# Patient Record
Sex: MALE | Race: WHITE | Employment: OTHER | URBAN - METROPOLITAN AREA
[De-identification: names, ages, dates, MRNs, and addresses within clinical notes are randomized per-mention and may not be internally consistent; named-entity substitution may affect disease eponyms.]

---

## 2018-09-06 PROBLEM — K56.609 SBO (SMALL BOWEL OBSTRUCTION) (HCC): Status: ACTIVE | Noted: 2018-09-06

## 2018-09-06 PROBLEM — E03.9 ACQUIRED HYPOTHYROIDISM: Status: ACTIVE | Noted: 2018-09-06

## 2018-09-06 PROBLEM — I10 ESSENTIAL HYPERTENSION: Status: ACTIVE | Noted: 2018-09-06

## 2018-09-24 ENCOUNTER — APPOINTMENT (OUTPATIENT)
Dept: GENERAL RADIOLOGY | Age: 83
End: 2018-09-24
Payer: COMMERCIAL

## 2018-09-24 ENCOUNTER — HOSPITAL ENCOUNTER (EMERGENCY)
Age: 83
Discharge: HOME | End: 2018-09-24
Attending: EMERGENCY MEDICINE
Payer: COMMERCIAL

## 2018-09-24 VITALS
SYSTOLIC BLOOD PRESSURE: 119 MMHG | HEART RATE: 60 BPM | TEMPERATURE: 97.7 F | OXYGEN SATURATION: 98 % | RESPIRATION RATE: 18 BRPM | DIASTOLIC BLOOD PRESSURE: 74 MMHG

## 2018-09-24 DIAGNOSIS — R07.89 ATYPICAL CHEST PAIN: Primary | ICD-10-CM

## 2018-09-24 DIAGNOSIS — J18.9 PNEUMONIA DUE TO ORGANISM: ICD-10-CM

## 2018-09-24 LAB
ALBUMIN SERPL-MCNC: 3.1 G/DL (ref 3.4–5)
ALP BLD-CCNC: 94 U/L (ref 40–129)
ALT SERPL-CCNC: 16 U/L (ref 10–40)
ANION GAP SERPL CALCULATED.3IONS-SCNC: 10 MMOL/L (ref 3–16)
AST SERPL-CCNC: 29 U/L (ref 15–37)
BASOPHILS ABSOLUTE: 0.1 K/UL (ref 0–0.2)
BASOPHILS RELATIVE PERCENT: 1.1 %
BILIRUB SERPL-MCNC: 0.7 MG/DL (ref 0–1)
BILIRUBIN DIRECT: <0.2 MG/DL (ref 0–0.3)
BILIRUBIN URINE: NEGATIVE
BILIRUBIN, INDIRECT: ABNORMAL MG/DL (ref 0–1)
BLOOD, URINE: NEGATIVE
BUN BLDV-MCNC: 15 MG/DL (ref 7–20)
CALCIUM SERPL-MCNC: 8.8 MG/DL (ref 8.3–10.6)
CHLORIDE BLD-SCNC: 100 MMOL/L (ref 99–110)
CLARITY: CLEAR
CO2: 28 MMOL/L (ref 21–32)
COLOR: YELLOW
CREAT SERPL-MCNC: 0.8 MG/DL (ref 0.8–1.3)
EOSINOPHILS ABSOLUTE: 0.1 K/UL (ref 0–0.6)
EOSINOPHILS RELATIVE PERCENT: 1.4 %
EPITHELIAL CELLS, UA: 1 /HPF (ref 0–5)
GFR AFRICAN AMERICAN: >60
GFR NON-AFRICAN AMERICAN: >60
GLUCOSE BLD-MCNC: 91 MG/DL (ref 70–99)
GLUCOSE URINE: NEGATIVE MG/DL
HCT VFR BLD CALC: 35.9 % (ref 40.5–52.5)
HEMOGLOBIN: 12.2 G/DL (ref 13.5–17.5)
HYALINE CASTS: 2 /LPF (ref 0–8)
KETONES, URINE: ABNORMAL MG/DL
LEUKOCYTE ESTERASE, URINE: ABNORMAL
LYMPHOCYTES ABSOLUTE: 1.4 K/UL (ref 1–5.1)
LYMPHOCYTES RELATIVE PERCENT: 19.9 %
MCH RBC QN AUTO: 33.6 PG (ref 26–34)
MCHC RBC AUTO-ENTMCNC: 34 G/DL (ref 31–36)
MCV RBC AUTO: 98.7 FL (ref 80–100)
MICROSCOPIC EXAMINATION: YES
MONOCYTES ABSOLUTE: 0.6 K/UL (ref 0–1.3)
MONOCYTES RELATIVE PERCENT: 8.9 %
NEUTROPHILS ABSOLUTE: 4.7 K/UL (ref 1.7–7.7)
NEUTROPHILS RELATIVE PERCENT: 68.7 %
NITRITE, URINE: NEGATIVE
PDW BLD-RTO: 13.6 % (ref 12.4–15.4)
PH UA: 7
PLATELET # BLD: 296 K/UL (ref 135–450)
PMV BLD AUTO: 7.9 FL (ref 5–10.5)
POTASSIUM SERPL-SCNC: 5.1 MMOL/L (ref 3.5–5.1)
PRO-BNP: 1305 PG/ML (ref 0–449)
PROTEIN UA: NEGATIVE MG/DL
RBC # BLD: 3.64 M/UL (ref 4.2–5.9)
RBC UA: 2 /HPF (ref 0–4)
SODIUM BLD-SCNC: 138 MMOL/L (ref 136–145)
SPECIFIC GRAVITY UA: 1.02
TOTAL PROTEIN: 6.9 G/DL (ref 6.4–8.2)
TROPONIN: 0.03 NG/ML
URINE REFLEX TO CULTURE: YES
URINE TYPE: ABNORMAL
UROBILINOGEN, URINE: 1 E.U./DL
WBC # BLD: 6.9 K/UL (ref 4–11)
WBC UA: 7 /HPF (ref 0–5)

## 2018-09-24 PROCEDURE — 83880 ASSAY OF NATRIURETIC PEPTIDE: CPT

## 2018-09-24 PROCEDURE — 99285 EMERGENCY DEPT VISIT HI MDM: CPT

## 2018-09-24 PROCEDURE — 36415 COLL VENOUS BLD VENIPUNCTURE: CPT

## 2018-09-24 PROCEDURE — 87086 URINE CULTURE/COLONY COUNT: CPT

## 2018-09-24 PROCEDURE — 80048 BASIC METABOLIC PNL TOTAL CA: CPT

## 2018-09-24 PROCEDURE — 71045 X-RAY EXAM CHEST 1 VIEW: CPT

## 2018-09-24 PROCEDURE — 87077 CULTURE AEROBIC IDENTIFY: CPT

## 2018-09-24 PROCEDURE — 87186 SC STD MICRODIL/AGAR DIL: CPT

## 2018-09-24 PROCEDURE — 93005 ELECTROCARDIOGRAM TRACING: CPT | Performed by: ANESTHESIOLOGY

## 2018-09-24 PROCEDURE — 80076 HEPATIC FUNCTION PANEL: CPT

## 2018-09-24 PROCEDURE — 84484 ASSAY OF TROPONIN QUANT: CPT

## 2018-09-24 PROCEDURE — 85025 COMPLETE CBC W/AUTO DIFF WBC: CPT

## 2018-09-24 PROCEDURE — 81001 URINALYSIS AUTO W/SCOPE: CPT

## 2018-09-24 RX ORDER — DOXYCYCLINE 100 MG/1
100 TABLET ORAL 2 TIMES DAILY
Qty: 14 TABLET | Refills: 0 | Status: SHIPPED | OUTPATIENT
Start: 2018-09-24 | End: 2018-10-01

## 2018-09-24 NOTE — ED PROVIDER NOTES
Take 5 mg by mouth daily    IPRATROPIUM-ALBUTEROL (DUONEB) 0.5-2.5 (3) MG/3ML SOLN NEBULIZER SOLUTION    Inhale 3 mLs into the lungs every 4 hours as needed for Shortness of Breath    LEVOTHYROXINE (SYNTHROID) 100 MCG TABLET    Take 100 mcg by mouth Daily    MIRTAZAPINE (REMERON) 15 MG TABLET    Take 7.5 mg by mouth nightly     QUETIAPINE (SEROQUEL XR) 50 MG EXTENDED RELEASE TABLET    Take 25 mg by mouth nightly    SIMVASTATIN (ZOCOR) 80 MG TABLET    Take 80 mg by mouth nightly        ALLERGIES     Patient has no known allergies. FAMILY HISTORY     History reviewed. No pertinent family history. SOCIAL HISTORY       Social History     Social History    Marital status:      Spouse name: N/A    Number of children: N/A    Years of education: N/A     Social History Main Topics    Smoking status: Never Smoker    Smokeless tobacco: Never Used    Alcohol use Yes      Comment: occasional    Drug use: No    Sexual activity: Not Asked     Other Topics Concern    None     Social History Narrative    None       SCREENINGS             PHYSICAL EXAM    (up to 7 for level 4, 8 or more for level 5)     ED Triage Vitals   BP Temp Temp src Pulse Resp SpO2 Height Weight   -- -- -- -- -- -- -- --       Physical Exam   Constitutional: He is oriented to person, place, and time. He appears well-developed and well-nourished. No distress. HENT:   Head: Normocephalic and atraumatic. Eyes: EOM are normal. No scleral icterus. Neck: Normal range of motion. No tracheal deviation present. Pulmonary/Chest: Effort normal. No respiratory distress. He has no wheezes. Abdominal: Soft. He exhibits no distension. Musculoskeletal: He exhibits no edema or deformity. Neurological: He is alert and oriented to person, place, and time. Coordination normal.   Nursing note and vitals reviewed.       DIAGNOSTIC RESULTS   LABS:    Labs Reviewed   CBC WITH AUTO DIFFERENTIAL - Abnormal; Notable for the following:

## 2018-09-24 NOTE — ED NOTES
Bed: 24  Expected date:   Expected time:   Means of arrival:   Comments:  marla Escobar [facility]  09/24/18 5354

## 2018-09-25 PROCEDURE — 93010 ELECTROCARDIOGRAM REPORT: CPT | Performed by: INTERNAL MEDICINE

## 2018-09-27 LAB
EKG ATRIAL RATE: 60 BPM
EKG DIAGNOSIS: NORMAL
EKG Q-T INTERVAL: 478 MS
EKG QRS DURATION: 156 MS
EKG QTC CALCULATION (BAZETT): 478 MS
EKG R AXIS: -25 DEGREES
EKG T AXIS: 89 DEGREES
EKG VENTRICULAR RATE: 60 BPM
ORGANISM: ABNORMAL
URINE CULTURE, ROUTINE: ABNORMAL
URINE CULTURE, ROUTINE: ABNORMAL

## 2018-12-15 ENCOUNTER — HOSPITAL ENCOUNTER (INPATIENT)
Age: 83
LOS: 1 days | Discharge: HOME | DRG: 092 | End: 2018-12-16
Attending: EMERGENCY MEDICINE | Admitting: EMERGENCY MEDICINE
Payer: COMMERCIAL

## 2018-12-15 ENCOUNTER — APPOINTMENT (OUTPATIENT)
Dept: CT IMAGING | Age: 83
DRG: 092 | End: 2018-12-15
Payer: COMMERCIAL

## 2018-12-15 ENCOUNTER — APPOINTMENT (OUTPATIENT)
Dept: GENERAL RADIOLOGY | Age: 83
DRG: 092 | End: 2018-12-15
Payer: COMMERCIAL

## 2018-12-15 DIAGNOSIS — R47.01 APHASIA: Primary | ICD-10-CM

## 2018-12-15 PROBLEM — R29.810 FACIAL DROOP: Status: ACTIVE | Noted: 2018-12-15

## 2018-12-15 LAB
A/G RATIO: 1.1 (ref 1.1–2.2)
ALBUMIN SERPL-MCNC: 3.6 G/DL (ref 3.4–5)
ALP BLD-CCNC: 73 U/L (ref 40–129)
ALT SERPL-CCNC: 12 U/L (ref 10–40)
ANION GAP SERPL CALCULATED.3IONS-SCNC: 9 MMOL/L (ref 3–16)
AST SERPL-CCNC: 26 U/L (ref 15–37)
BASOPHILS ABSOLUTE: 0.1 K/UL (ref 0–0.2)
BASOPHILS RELATIVE PERCENT: 0.9 %
BILIRUB SERPL-MCNC: 1.1 MG/DL (ref 0–1)
BUN BLDV-MCNC: 17 MG/DL (ref 7–20)
CALCIUM SERPL-MCNC: 9.2 MG/DL (ref 8.3–10.6)
CHLORIDE BLD-SCNC: 97 MMOL/L (ref 99–110)
CO2: 27 MMOL/L (ref 21–32)
CREAT SERPL-MCNC: 0.9 MG/DL (ref 0.8–1.3)
EOSINOPHILS ABSOLUTE: 0.2 K/UL (ref 0–0.6)
EOSINOPHILS RELATIVE PERCENT: 3.1 %
GFR AFRICAN AMERICAN: >60
GFR NON-AFRICAN AMERICAN: >60
GLOBULIN: 3.3 G/DL
GLUCOSE BLD-MCNC: 78 MG/DL (ref 70–99)
GLUCOSE BLD-MCNC: 82 MG/DL (ref 70–99)
HCT VFR BLD CALC: 37.6 % (ref 40.5–52.5)
HEMOGLOBIN: 12.6 G/DL (ref 13.5–17.5)
INR BLD: 1.14 (ref 0.86–1.14)
LYMPHOCYTES ABSOLUTE: 2.5 K/UL (ref 1–5.1)
LYMPHOCYTES RELATIVE PERCENT: 36.9 %
MCH RBC QN AUTO: 33.2 PG (ref 26–34)
MCHC RBC AUTO-ENTMCNC: 33.6 G/DL (ref 31–36)
MCV RBC AUTO: 98.9 FL (ref 80–100)
MONOCYTES ABSOLUTE: 0.7 K/UL (ref 0–1.3)
MONOCYTES RELATIVE PERCENT: 10.8 %
NEUTROPHILS ABSOLUTE: 3.3 K/UL (ref 1.7–7.7)
NEUTROPHILS RELATIVE PERCENT: 48.3 %
PDW BLD-RTO: 13.7 % (ref 12.4–15.4)
PERFORMED ON: NORMAL
PLATELET # BLD: 154 K/UL (ref 135–450)
PMV BLD AUTO: 7.7 FL (ref 5–10.5)
POTASSIUM REFLEX MAGNESIUM: 4.6 MMOL/L (ref 3.5–5.1)
PRO-BNP: 1719 PG/ML (ref 0–449)
PROTHROMBIN TIME: 13 SEC (ref 9.8–13)
RBC # BLD: 3.8 M/UL (ref 4.2–5.9)
SODIUM BLD-SCNC: 133 MMOL/L (ref 136–145)
TOTAL PROTEIN: 6.9 G/DL (ref 6.4–8.2)
TROPONIN: <0.01 NG/ML
WBC # BLD: 6.9 K/UL (ref 4–11)

## 2018-12-15 PROCEDURE — G0378 HOSPITAL OBSERVATION PER HR: HCPCS

## 2018-12-15 PROCEDURE — 70496 CT ANGIOGRAPHY HEAD: CPT

## 2018-12-15 PROCEDURE — 99285 EMERGENCY DEPT VISIT HI MDM: CPT

## 2018-12-15 PROCEDURE — 85610 PROTHROMBIN TIME: CPT

## 2018-12-15 PROCEDURE — 84484 ASSAY OF TROPONIN QUANT: CPT

## 2018-12-15 PROCEDURE — 96374 THER/PROPH/DIAG INJ IV PUSH: CPT

## 2018-12-15 PROCEDURE — 93005 ELECTROCARDIOGRAM TRACING: CPT | Performed by: EMERGENCY MEDICINE

## 2018-12-15 PROCEDURE — 6360000002 HC RX W HCPCS: Performed by: FAMILY MEDICINE

## 2018-12-15 PROCEDURE — 6360000004 HC RX CONTRAST MEDICATION: Performed by: PHYSICIAN ASSISTANT

## 2018-12-15 PROCEDURE — 80053 COMPREHEN METABOLIC PANEL: CPT

## 2018-12-15 PROCEDURE — 70498 CT ANGIOGRAPHY NECK: CPT

## 2018-12-15 PROCEDURE — 6360000002 HC RX W HCPCS: Performed by: EMERGENCY MEDICINE

## 2018-12-15 PROCEDURE — 94760 N-INVAS EAR/PLS OXIMETRY 1: CPT

## 2018-12-15 PROCEDURE — 2580000003 HC RX 258: Performed by: FAMILY MEDICINE

## 2018-12-15 PROCEDURE — 85025 COMPLETE CBC W/AUTO DIFF WBC: CPT

## 2018-12-15 PROCEDURE — 6370000000 HC RX 637 (ALT 250 FOR IP): Performed by: EMERGENCY MEDICINE

## 2018-12-15 PROCEDURE — 83880 ASSAY OF NATRIURETIC PEPTIDE: CPT

## 2018-12-15 PROCEDURE — 96372 THER/PROPH/DIAG INJ SC/IM: CPT

## 2018-12-15 PROCEDURE — 71045 X-RAY EXAM CHEST 1 VIEW: CPT

## 2018-12-15 PROCEDURE — 1200000000 HC SEMI PRIVATE

## 2018-12-15 PROCEDURE — 70450 CT HEAD/BRAIN W/O DYE: CPT

## 2018-12-15 RX ORDER — MIRTAZAPINE 15 MG/1
7.5 TABLET, FILM COATED ORAL NIGHTLY
Status: DISCONTINUED | OUTPATIENT
Start: 2018-12-15 | End: 2018-12-16 | Stop reason: HOSPADM

## 2018-12-15 RX ORDER — CETIRIZINE HYDROCHLORIDE 10 MG/1
5 TABLET ORAL DAILY
Status: DISCONTINUED | OUTPATIENT
Start: 2018-12-15 | End: 2018-12-16 | Stop reason: HOSPADM

## 2018-12-15 RX ORDER — ASPIRIN 81 MG/1
324 TABLET, CHEWABLE ORAL ONCE
Status: COMPLETED | OUTPATIENT
Start: 2018-12-15 | End: 2018-12-15

## 2018-12-15 RX ORDER — QUETIAPINE FUMARATE 25 MG/1
25 TABLET, FILM COATED ORAL NIGHTLY
Status: DISCONTINUED | OUTPATIENT
Start: 2018-12-15 | End: 2018-12-16 | Stop reason: HOSPADM

## 2018-12-15 RX ORDER — LEVOTHYROXINE SODIUM 0.1 MG/1
100 TABLET ORAL DAILY
Status: DISCONTINUED | OUTPATIENT
Start: 2018-12-16 | End: 2018-12-16 | Stop reason: HOSPADM

## 2018-12-15 RX ORDER — ASPIRIN 81 MG/1
81 TABLET ORAL DAILY
Status: DISCONTINUED | OUTPATIENT
Start: 2018-12-15 | End: 2018-12-16 | Stop reason: HOSPADM

## 2018-12-15 RX ORDER — SODIUM CHLORIDE 0.9 % (FLUSH) 0.9 %
10 SYRINGE (ML) INJECTION EVERY 12 HOURS SCHEDULED
Status: DISCONTINUED | OUTPATIENT
Start: 2018-12-15 | End: 2018-12-16 | Stop reason: HOSPADM

## 2018-12-15 RX ORDER — ONDANSETRON 2 MG/ML
4 INJECTION INTRAMUSCULAR; INTRAVENOUS EVERY 6 HOURS PRN
Status: DISCONTINUED | OUTPATIENT
Start: 2018-12-15 | End: 2018-12-16 | Stop reason: HOSPADM

## 2018-12-15 RX ORDER — IPRATROPIUM BROMIDE AND ALBUTEROL SULFATE 2.5; .5 MG/3ML; MG/3ML
3 SOLUTION RESPIRATORY (INHALATION) EVERY 4 HOURS PRN
Status: DISCONTINUED | OUTPATIENT
Start: 2018-12-15 | End: 2018-12-16 | Stop reason: HOSPADM

## 2018-12-15 RX ORDER — SODIUM CHLORIDE 0.9 % (FLUSH) 0.9 %
10 SYRINGE (ML) INJECTION PRN
Status: DISCONTINUED | OUTPATIENT
Start: 2018-12-15 | End: 2018-12-16 | Stop reason: HOSPADM

## 2018-12-15 RX ORDER — LORAZEPAM 2 MG/ML
0.5 INJECTION INTRAMUSCULAR ONCE
Status: COMPLETED | OUTPATIENT
Start: 2018-12-15 | End: 2018-12-15

## 2018-12-15 RX ORDER — SIMVASTATIN 40 MG
80 TABLET ORAL NIGHTLY
Status: DISCONTINUED | OUTPATIENT
Start: 2018-12-15 | End: 2018-12-16 | Stop reason: HOSPADM

## 2018-12-15 RX ADMIN — ASPIRIN 81 MG 324 MG: 81 TABLET ORAL at 17:55

## 2018-12-15 RX ADMIN — Medication 10 ML: at 21:00

## 2018-12-15 RX ADMIN — ENOXAPARIN SODIUM 40 MG: 40 INJECTION SUBCUTANEOUS at 20:59

## 2018-12-15 RX ADMIN — IOPAMIDOL 100 ML: 755 INJECTION, SOLUTION INTRAVENOUS at 15:42

## 2018-12-15 RX ADMIN — LORAZEPAM 0.5 MG: 2 INJECTION INTRAMUSCULAR; INTRAVENOUS at 17:56

## 2018-12-15 NOTE — ED PROVIDER NOTES
Level of consciousness: 0=alert; keenly responsive   1b. LOC questions:  0=Performs both tasks correctly   1c. LOC commands: 0=Performs both tasks correctly   2. Best Gaze: 0=normal   3. Visual: 0=No visual loss   4. Facial Palsy: 1=Minor paralysis (flattened nasolabial fold, asymmetric on smiling)   5a. Motor left arm: 0=No drift, limb holds 90 (or 45) degrees for full 10 seconds   5b. Motor right arm: 0=No drift, limb holds 90 (or 45) degrees for full 10 seconds   6a. motor left le=No drift, limb holds 90 (or 45) degrees for full 10 seconds   6b  Motor right le=No drift, limb holds 90 (or 45) degrees for full 10 seconds   7. Limb Ataxia: 0=Absent   8. Sensory: 0=Normal; no sensory loss   9. Best Language:  1=Mild to moderate aphasia; some obvious loss of fluency or facility of comprehension without significant limitation on ideas expressed or form of expression. 10. Dysarthria: 0=Normal   11. Extinction and Inattention: 0=No abnormality         Total:  2     No other complaints, modifying factors or associated symptoms. I have reviewed the following from the nursing documentation. Past Medical History:   Diagnosis Date    [facility]fib ([facility] [facility]     Cancer ([facility] [address]     colon    Hyperlipemia     Hypertension     Thyroid disease      Past Surgical History:   Procedure Laterality Date    COLECTOMY      r/t ca    PACEMAKER PLACEMENT       History reviewed. No pertinent family history. Social History     Social History    Marital status:      Spouse name: N/A    Number of children: N/A    Years of education: N/A     Occupational History    Not on file.      Social History Main Topics    Smoking status: Never Smoker    Smokeless tobacco: Never Used    Alcohol use Yes      Comment: occasional    Drug use: No    Sexual activity: Not Currently     Other Topics Concern    Not on file     Social History Narrative    No narrative on file     Current Facility-Administered Medications   Medication Dose Route Frequency Provider Last Rate Last Dose    aspirin chewable tablet 324 mg  324 mg Oral Once Ewa Mccloud MD        LORazepam (ATIVAN) injection 0.5 mg  0.5 mg Intravenous Once Ewa Mccloud MD         Current Outpatient Prescriptions   Medication Sig Dispense Refill    ipratropium-albuterol (DUONEB) 0.5-2.5 (3) MG/3ML SOLN nebulizer solution Inhale 3 mLs into the lungs every 4 hours as needed for Shortness of Breath 360 mL 1    atropine 1 % ophthalmic solution 1 drop 3 times daily      carboxymethylcellulose 1 % ophthalmic solution Place 1 drop into both eyes 3 times daily      cetirizine (ZYRTEC) 5 MG tablet Take 5 mg by mouth daily      levothyroxine (SYNTHROID) 100 MCG tablet Take 100 mcg by mouth Daily      QUEtiapine (SEROQUEL XR) 50 MG extended release tablet Take 25 mg by mouth nightly      simvastatin (ZOCOR) 80 MG tablet Take 80 mg by mouth nightly       mirtazapine (REMERON) 15 MG tablet Take 7.5 mg by mouth nightly        No Known Allergies    REVIEW OF SYSTEMS  10 systems reviewed, pertinent positives per HPI otherwise noted to be negative. PHYSICAL EXAM  BP (!) 119/58   Pulse 65   Temp 98 °F (36.7 °C) (Oral)   Resp 14   Ht 6' (1.829 m)   Wt 158 lb (71.7 kg)   SpO2 99%   BMI 21.43 kg/m²    GENERAL APPEARANCE: Awake and alert. Cooperative. No distress. HENT: Normocephalic. Atraumatic. Mucous membranes are moist.  Mild R facial droop with weakness puffing his cheeks out. NECK: Supple. No bruits. EYES: PERRL. EOM's grossly intact. HEART/CHEST: RRR. No murmurs. No chest wall tenderness. LUNGS: Respirations unlabored. CTAB. Good air exchange. Speaking comfortably in full sentences. ABDOMEN: No tenderness. Soft. Non-distended. No masses. No organomegaly. No guarding or rebound. Normal bowel sounds throughout. MUSCULOSKELETAL: No extremity edema. Compartments soft. No deformity. No tenderness in the extremities.   All extremities neurovascularly extra-axial fluid collection. The gray-white differentiation appears grossly maintained. No significant high-grade stenosis or focal occlusion involving the intracranial cervical vasculature. No evidence of acute dissection. No sizable aneurysm. Please note the examination was degraded by patient motion. Marked thickening of the thoracic esophagus. Consider follow-up endoscopy when clinically appropriate. Cardiomegaly. Cta Head W Contrast    Result Date: 12/15/2018  EXAMINATION: CTA OF THE HEAD WITH CONTRAST; CTA OF THE NECK 12/15/2018 3:42 pm: TECHNIQUE: CTA of the head/brain was performed with the administration of intravenous contrast. Multiplanar reformatted images are provided for review. MIP images are provided for review. Dose modulation, iterative reconstruction, and/or weight based adjustment of the mA/kV was utilized to reduce the radiation dose to as low as reasonably achievable.; CTA of the neck was performed with the administration of intravenous contrast. Multiplanar reformatted images are provided for review. MIP images are provided for review. Stenosis of the internal carotid arteries measured using NASCET criteria. Dose modulation, iterative reconstruction, and/or weight based adjustment of the mA/kV was utilized to reduce the radiation dose to as low as reasonably achievable. COMPARISON: None. HISTORY: ORDERING SYSTEM PROVIDED HISTORY: facial droop, slurred speech TECHNOLOGIST PROVIDED HISTORY: Has a \"code stroke\" or \"stroke alert\" been called? ->Yes; ORDERING SYSTEM PROVIDED HISTORY: facial droop, slurred speech TECHNOLOGIST PROVIDED HISTORY: Has a \"code stroke\" or \"stroke alert\" been called? ->No FINDINGS: CTA NECK: AORTIC ARCH/ARCH VESSELS: Atherosclerotic disease involving the thoracic aorta, and bilateral subclavian arteries. No flow-limiting stenosis. CAROTID ARTERIES: The common carotid arteries are normal in appearance without evidence of a flow limiting stenosis.

## 2018-12-16 VITALS
TEMPERATURE: 97.7 F | BODY MASS INDEX: 19.16 KG/M2 | SYSTOLIC BLOOD PRESSURE: 115 MMHG | WEIGHT: 141.5 LBS | HEIGHT: 72 IN | RESPIRATION RATE: 18 BRPM | OXYGEN SATURATION: 93 % | DIASTOLIC BLOOD PRESSURE: 68 MMHG | HEART RATE: 58 BPM

## 2018-12-16 LAB
CHOLESTEROL, TOTAL: 156 MG/DL (ref 0–199)
EKG ATRIAL RATE: 60 BPM
EKG DIAGNOSIS: NORMAL
EKG Q-T INTERVAL: 486 MS
EKG QRS DURATION: 164 MS
EKG QTC CALCULATION (BAZETT): 486 MS
EKG R AXIS: -17 DEGREES
EKG T AXIS: 81 DEGREES
EKG VENTRICULAR RATE: 60 BPM
HDLC SERPL-MCNC: 52 MG/DL (ref 40–60)
LDL CHOLESTEROL CALCULATED: 85 MG/DL
TRIGL SERPL-MCNC: 95 MG/DL (ref 0–150)
VLDLC SERPL CALC-MCNC: 19 MG/DL

## 2018-12-16 PROCEDURE — 99223 1ST HOSP IP/OBS HIGH 75: CPT | Performed by: PSYCHIATRY & NEUROLOGY

## 2018-12-16 PROCEDURE — 36415 COLL VENOUS BLD VENIPUNCTURE: CPT

## 2018-12-16 PROCEDURE — 96372 THER/PROPH/DIAG INJ SC/IM: CPT

## 2018-12-16 PROCEDURE — 94640 AIRWAY INHALATION TREATMENT: CPT

## 2018-12-16 PROCEDURE — 94664 DEMO&/EVAL PT USE INHALER: CPT

## 2018-12-16 PROCEDURE — 93010 ELECTROCARDIOGRAM REPORT: CPT | Performed by: INTERNAL MEDICINE

## 2018-12-16 PROCEDURE — 94760 N-INVAS EAR/PLS OXIMETRY 1: CPT

## 2018-12-16 PROCEDURE — 2580000003 HC RX 258: Performed by: FAMILY MEDICINE

## 2018-12-16 PROCEDURE — G0378 HOSPITAL OBSERVATION PER HR: HCPCS

## 2018-12-16 PROCEDURE — 6360000002 HC RX W HCPCS: Performed by: FAMILY MEDICINE

## 2018-12-16 PROCEDURE — 80061 LIPID PANEL: CPT

## 2018-12-16 PROCEDURE — 6370000000 HC RX 637 (ALT 250 FOR IP): Performed by: FAMILY MEDICINE

## 2018-12-16 RX ORDER — ASPIRIN 81 MG/1
81 TABLET ORAL DAILY
Qty: 30 TABLET | Refills: 3
Start: 2018-12-17 | End: 2019-06-11 | Stop reason: ALTCHOICE

## 2018-12-16 RX ADMIN — Medication 10 ML: at 09:57

## 2018-12-16 RX ADMIN — IPRATROPIUM BROMIDE AND ALBUTEROL SULFATE 3 ML: .5; 3 SOLUTION RESPIRATORY (INHALATION) at 07:58

## 2018-12-16 RX ADMIN — ENOXAPARIN SODIUM 40 MG: 40 INJECTION SUBCUTANEOUS at 09:54

## 2018-12-16 ASSESSMENT — PAIN SCALES - GENERAL
PAINLEVEL_OUTOF10: 0

## 2018-12-16 NOTE — FLOWSHEET NOTE
12/15/18 8037   NIH/MNHISS Stroke Scale   Notes Patient very uncooperative with testing. Refusing to follow commands and to answer questions. Unsure of what is deficits and what is unwillingness to comply with scale.

## 2018-12-16 NOTE — PROGRESS NOTES
Paged hospitalist per pt request:   \"pt's daughter wants pt to be discharged and she does not want to wait for speech to see pt before he eats. she want all remaining testing to be completed outpatient. \"

## 2018-12-16 NOTE — H&P
Prescriptions on File Prior to Encounter   Medication Sig Dispense Refill    ipratropium-albuterol (DUONEB) 0.5-2.5 (3) MG/3ML SOLN nebulizer solution Inhale 3 mLs into the lungs every 4 hours as needed for Shortness of Breath 360 mL 1    atropine 1 % ophthalmic solution 1 drop 3 times daily      carboxymethylcellulose 1 % ophthalmic solution Place 1 drop into both eyes 3 times daily      cetirizine (ZYRTEC) 5 MG tablet Take 5 mg by mouth daily      levothyroxine (SYNTHROID) 100 MCG tablet Take 100 mcg by mouth Daily      QUEtiapine (SEROQUEL XR) 50 MG extended release tablet Take 25 mg by mouth nightly      simvastatin (ZOCOR) 80 MG tablet Take 80 mg by mouth nightly       mirtazapine (REMERON) 15 MG tablet Take 7.5 mg by mouth nightly          Allergies:  No Known Allergies     Social History:   reports that he has never smoked. He has never used smokeless tobacco. He reports that he drinks alcohol. He reports that he does not use drugs. Family History:  family history is not on file. ,     Physical Exam:  /79   Pulse 60   Temp 98.1 °F (36.7 °C) (Temporal)   Resp 18   Ht 6' (1.829 m)   Wt 158 lb (71.7 kg)   SpO2 98%   BMI 21.43 kg/m²     General appearance:  Appears comfortable. Well nourished  Eyes: Sclera clear, pupils equal  ENT: Moist mucus membranes, no thrush. Trachea midline. Cardiovascular: Regular rhythm, normal S1, S2. No murmur, gallop, rub. No edema in lower extremities  Respiratory: Clear to auscultation bilaterally, no wheeze, good inspiratory effort  Gastrointestinal: Abdomen soft, non-tender, not distended, normal bowel sounds  Musculoskeletal: No cyanosis in digits, neck supple  Neurology: Cranial nerves grossly intact. Alert and oriented in time, place and person. No speech or motor deficits  Psychiatry: Appropriate affect.  Not agitated  Skin: Warm, dry, normal turgor, no rash    Labs:  CBC:   Lab Results   Component Value Date    WBC 6.9 12/15/2018    RBC 3.80 12/15/2018    HGB 12.6 12/15/2018    HCT 37.6 12/15/2018    MCV 98.9 12/15/2018    MCH 33.2 12/15/2018    MCHC 33.6 12/15/2018    RDW 13.7 12/15/2018     12/15/2018    MPV 7.7 12/15/2018     BMP:    Lab Results   Component Value Date     12/15/2018    K 4.6 12/15/2018    CL 97 12/15/2018    CO2 27 12/15/2018    BUN 17 12/15/2018    CREATININE 0.9 12/15/2018    CALCIUM 9.2 12/15/2018    GFRAA >60 12/15/2018    LABGLOM >60 12/15/2018    GLUCOSE 78 12/15/2018       Chest Xray:   EKG:        Problem List        Assessment/Plan:       Aphasia and facial droop   Concerning for stroke  - CT of the head is negative for acute findings CTA head and neck showed no significant stenosis-MRI of the brain,  EChocardiogram pending  On Aspirin and lipotor Speech and language therapy and PT/OT consulted   - Neurology consult in place    AFib not on anticoagulation    Admit as inpatient I anticipate hospitalization spanning more than two midnights for investigation and treatment of the above medically necessary diagnoses.       Jamia Garcia MD    12/15/2018 7:33 PM

## 2018-12-16 NOTE — PROGRESS NOTES
RESPIRATORY THERAPY ASSESSMENT    Name:  [address] Record Number:  [id number]  Age: 80 y.o. Gender: male  : 3/6/1923  Today's Date:  2018  Room:  E6D-8545/5906-01    Assessment   Patient Admission Diagnosis      Allergies  No Known Allergies    Minimum Predicted Vital Capacity:     xxx          Actual Vital Capacity:      xxx          Pulmonary History: CHF/Pulmonary Edema   Home Oxygen Therapy:  room air  Home Respiratory Therapy: Duoneb q4prn  Current Respiratory Therapy:  duoneb q4prn          Respiratory Severity Index(RSI)   Patients with orders for inhalation medications, oxygen, or any therapeutic treatment modality will be placed on Respiratory Protocol. They will be assessed with the first treatment and at least every 72 hours thereafter. The following severity scale will be used to determine frequency of treatment intervention.     Smoking History: No Smoking History = 0    Social History  Social History   Substance Use Topics    Smoking status: Never Smoker    Smokeless tobacco: Never Used    Alcohol use Yes      Comment: occasional       Recent Surgical History: None = 0  Past Surgical History  Past Surgical History:   Procedure Laterality Date    COLECTOMY      r/t ca    PACEMAKER PLACEMENT         Level of Consciousness: Alert, Oriented, and Cooperative = 0    Level of Activity: Mostly sedentary, minimal walking = 2    Respiratory Pattern: Regular Pattern; RR 8-20 = 0    Breath Sounds: Diminshed bilaterally and/or crackles = 2    Sputum   ,  ,    Cough: Strong, spontaneous, non-productive = 0    Vital Signs   /69   Pulse 60   Temp 97.9 °F (36.6 °C) (Temporal)   Resp 18   Ht 6' (1.829 m)   Wt 158 lb (71.7 kg)   SpO2 94%   BMI 21.43 kg/m²   SPO2 (COPD values may differ): Greater than or equal to 92% on room air = 0    Peak Flow (asthma only): not applicable = 0    RSI: 5-6 = Q4hr PRN (every four hours as needed) for dyspnea        Plan       Goals: medication

## 2018-12-16 NOTE — PROGRESS NOTES
Attempted to complete NIHSS, pt uncooperative. Family at bedside. Completed MRI checklist, faxed to MRI. Pt is absent from a fall this shift. Fall precautions in place. Patient instructed to call nursing staff when needing assistance. Pt with VU. Call light in reach. Will continue to monitor.  Angela Bro RN BSN

## 2018-12-16 NOTE — DISCHARGE SUMMARY
°C) (Temporal)   Resp 18   Ht 6' (1.829 m)   Wt 141 lb 8 oz (64.2 kg)   SpO2 93%   BMI 19.19 kg/m²   General appearance. Chronically ill appearing. Demented speech occasionally difficulty to understand, other times speech is clear   HEENT. Sclera clear. Moist mucus membranes. Cardiovascular. Regular rate and rhythm, normal S1, S2. No murmur. Respiratory. Not using accessory muscles. Clear to auscultation bilaterally, no wheeze. Gastrointestinal. Abdomen soft, non-tender, not distended, normal bowel sounds  Neurology. Moving all extremities equally with 4/5 power   Extremities. No edema in lower extremities. Skin. Warm, dry, normal turgor    Condition at time of discharge stable     Medication instructions provided to patient at discharge. Medication List      START taking these medications    aspirin 81 MG EC tablet  Take 1 tablet by mouth daily  Start taking on:  12/17/2018        CONTINUE taking these medications    atropine 1 % ophthalmic solution     carboxymethylcellulose 1 % ophthalmic solution     cetirizine 5 MG tablet  Commonly known as:  ZYRTEC     ipratropium-albuterol 0.5-2.5 (3) MG/3ML Soln nebulizer solution  Commonly known as:  DUONEB  Inhale 3 mLs into the lungs every 4 hours as needed for Shortness of Breath     levothyroxine 100 MCG tablet  Commonly known as:  SYNTHROID     mirtazapine 15 MG tablet  Commonly known as:  REMERON     QUEtiapine 50 MG extended release tablet  Commonly known as:  SEROQUEL XR     simvastatin 80 MG tablet  Commonly known as:  ZOCOR           Where to Get Your Medications      Information about where to get these medications is not yet available    Ask your nurse or doctor about these medications  · aspirin 81 MG EC tablet         Discharge recommendations given to patient. Follow Up. in 1 week   Disposition. Home   Activity.  As tolerated   Diet: Diet NPO Effective Now  Diet per daughter in law or speech recs   Spent > 30  minutes in discharge

## 2018-12-16 NOTE — PROGRESS NOTES
Data- pt's daughter demanded that pt be discharged and pt to have all other testing done outpatient. discharge order received, pt verbalized agreement to discharge, disposition to previous residence, no needs for HHC/DME. Action- discharge instructions prepared and given to pt's daughter, pt verbalized understanding. Medication information packet given r/t NEW and/or CHANGED prescriptions emphasizing name/purpose/side effects, pt verbalized understanding. Discharge instruction summary: Diet- general per pt's daughter, Activity-as tolerated, Primary Care Physician as follows: Fox Burnett [address] None f/u appointment to be scheduled by pt's daughter, immunizations reviewed and refused by pt's daughter. prescription medications filled per pt's pharmacy, Inpatient surgical procedure precautions reviewed:  CHF Education reviewed 60 minutes spent    Response- Pt belongings gathered, IV removed. Disposition is home (no HHC/DME needs), transported with family, taken to lobby via w/c w/transport, no complications.

## 2018-12-16 NOTE — CONSULTS
NEUROLOGY CONSULTATION     Patient's Name :   Chauncey Pope        YOB: 1923                    Date of Consultation : 12/16/2018 11:53 AM    Referring Physician :  Krystle Murphy MD    Reason for Consultation :    Possible stroke    HISTORY OF PRESENT ILLNESS :    Carl Chauhan is a 80 y.o. male   History was obtained from patient, his auditory lab the bedside and dictations in the chart. Patient has history of hypertension and hypothyroidism dementia atrial fibrillation and pacemaker who presented with facial droop and slurred speech for one day. Patient is not able to give much history and most of the history was obtained from the dictations in the chart as well as from the patient's daughter-in-law at the bedside. Patient has not had any focal weakness numbness vertigo or diplopia. His facial droop seems to be better but he still has slurred speech. Patient apparently needs a lot of help with home with his daily activities at home because of his dementia. CT head did not show any acute changes. CTA of the head and neck did not show any significant vascular disease. REVIEW OF SYSTEMS  Patient is unable to give any history. According to the daughter-in-law that his been no chest pain palpitations breathlessness fever or weight loss. Rest of the 14 system review was reviewed as much as possible and was negative except for the symptoms noted above    Past Medical History:   Diagnosis Date    [facility] [address]     [facility]     colon    Hyperlipemia     Hypertension     Thyroid disease      History reviewed. No pertinent family history. Social History     Social History    Marital status:       Spouse name: N/A    Number of children: N/A    Years of education: N/A     Social History Main Topics    Smoking status: Never Smoker    Smokeless tobacco: Never Used    Alcohol use Yes      Comment: occasional   

## 2019-06-11 ENCOUNTER — HOSPITAL ENCOUNTER (INPATIENT)
Age: 84
LOS: 1 days | Discharge: HOME | DRG: 178 | End: 2019-06-13
Attending: EMERGENCY MEDICINE | Admitting: INTERNAL MEDICINE
Payer: COMMERCIAL

## 2019-06-11 ENCOUNTER — APPOINTMENT (OUTPATIENT)
Dept: GENERAL RADIOLOGY | Age: 84
DRG: 178 | End: 2019-06-11
Payer: COMMERCIAL

## 2019-06-11 DIAGNOSIS — J18.9 PNEUMONIA DUE TO ORGANISM: Primary | ICD-10-CM

## 2019-06-11 DIAGNOSIS — E87.1 HYPONATREMIA: ICD-10-CM

## 2019-06-11 DIAGNOSIS — R62.7 ADULT FAILURE TO THRIVE: ICD-10-CM

## 2019-06-11 PROBLEM — Z65.9 SOCIAL PROBLEM: Status: ACTIVE | Noted: 2019-06-11

## 2019-06-11 LAB
A/G RATIO: 0.9 (ref 1.1–2.2)
ALBUMIN SERPL-MCNC: 3.7 G/DL (ref 3.4–5)
ALP BLD-CCNC: 95 U/L (ref 40–129)
ALT SERPL-CCNC: 14 U/L (ref 10–40)
ANION GAP SERPL CALCULATED.3IONS-SCNC: 10 MMOL/L (ref 3–16)
AST SERPL-CCNC: 28 U/L (ref 15–37)
BASOPHILS ABSOLUTE: 0 K/UL (ref 0–0.2)
BASOPHILS RELATIVE PERCENT: 0.3 %
BILIRUB SERPL-MCNC: 2.1 MG/DL (ref 0–1)
BUN BLDV-MCNC: 18 MG/DL (ref 7–20)
CALCIUM SERPL-MCNC: 9.3 MG/DL (ref 8.3–10.6)
CHLORIDE BLD-SCNC: 92 MMOL/L (ref 99–110)
CO2: 26 MMOL/L (ref 21–32)
CREAT SERPL-MCNC: 0.7 MG/DL (ref 0.8–1.3)
EOSINOPHILS ABSOLUTE: 0 K/UL (ref 0–0.6)
EOSINOPHILS RELATIVE PERCENT: 0.3 %
GFR AFRICAN AMERICAN: >60
GFR NON-AFRICAN AMERICAN: >60
GLOBULIN: 4.1 G/DL
GLUCOSE BLD-MCNC: 106 MG/DL (ref 70–99)
HCT VFR BLD CALC: 40.1 % (ref 40.5–52.5)
HEMOGLOBIN: 13.3 G/DL (ref 13.5–17.5)
INR BLD: 1.36 (ref 0.86–1.14)
LACTIC ACID: 0.9 MMOL/L (ref 0.4–2)
LIPASE: 21 U/L (ref 13–60)
LYMPHOCYTES ABSOLUTE: 1.3 K/UL (ref 1–5.1)
LYMPHOCYTES RELATIVE PERCENT: 12.9 %
MCH RBC QN AUTO: 31.3 PG (ref 26–34)
MCHC RBC AUTO-ENTMCNC: 33.2 G/DL (ref 31–36)
MCV RBC AUTO: 94.4 FL (ref 80–100)
MONOCYTES ABSOLUTE: 0.7 K/UL (ref 0–1.3)
MONOCYTES RELATIVE PERCENT: 7.5 %
NEUTROPHILS ABSOLUTE: 7.8 K/UL (ref 1.7–7.7)
NEUTROPHILS RELATIVE PERCENT: 79 %
PDW BLD-RTO: 13.7 % (ref 12.4–15.4)
PLATELET # BLD: 199 K/UL (ref 135–450)
PMV BLD AUTO: 7.5 FL (ref 5–10.5)
POTASSIUM REFLEX MAGNESIUM: 5.1 MMOL/L (ref 3.5–5.1)
PRO-BNP: 2652 PG/ML (ref 0–449)
PROTHROMBIN TIME: 15.5 SEC (ref 9.8–13)
RBC # BLD: 4.25 M/UL (ref 4.2–5.9)
SODIUM BLD-SCNC: 128 MMOL/L (ref 136–145)
TOTAL PROTEIN: 7.8 G/DL (ref 6.4–8.2)
TROPONIN: 0.01 NG/ML
WBC # BLD: 9.8 K/UL (ref 4–11)

## 2019-06-11 PROCEDURE — 85610 PROTHROMBIN TIME: CPT

## 2019-06-11 PROCEDURE — 96376 TX/PRO/DX INJ SAME DRUG ADON: CPT

## 2019-06-11 PROCEDURE — 94640 AIRWAY INHALATION TREATMENT: CPT

## 2019-06-11 PROCEDURE — 85025 COMPLETE CBC W/AUTO DIFF WBC: CPT

## 2019-06-11 PROCEDURE — 87801 DETECT AGNT MULT DNA AMPLI: CPT

## 2019-06-11 PROCEDURE — 80053 COMPREHEN METABOLIC PANEL: CPT

## 2019-06-11 PROCEDURE — 84443 ASSAY THYROID STIM HORMONE: CPT

## 2019-06-11 PROCEDURE — 99285 EMERGENCY DEPT VISIT HI MDM: CPT

## 2019-06-11 PROCEDURE — 6360000002 HC RX W HCPCS: Performed by: PHYSICIAN ASSISTANT

## 2019-06-11 PROCEDURE — 2580000003 HC RX 258: Performed by: PHYSICIAN ASSISTANT

## 2019-06-11 PROCEDURE — 93005 ELECTROCARDIOGRAM TRACING: CPT | Performed by: EMERGENCY MEDICINE

## 2019-06-11 PROCEDURE — 96374 THER/PROPH/DIAG INJ IV PUSH: CPT

## 2019-06-11 PROCEDURE — G0378 HOSPITAL OBSERVATION PER HR: HCPCS

## 2019-06-11 PROCEDURE — 96375 TX/PRO/DX INJ NEW DRUG ADDON: CPT

## 2019-06-11 PROCEDURE — 84484 ASSAY OF TROPONIN QUANT: CPT

## 2019-06-11 PROCEDURE — 83880 ASSAY OF NATRIURETIC PEPTIDE: CPT

## 2019-06-11 PROCEDURE — 83690 ASSAY OF LIPASE: CPT

## 2019-06-11 PROCEDURE — 6370000000 HC RX 637 (ALT 250 FOR IP): Performed by: PHYSICIAN ASSISTANT

## 2019-06-11 PROCEDURE — 6360000002 HC RX W HCPCS: Performed by: EMERGENCY MEDICINE

## 2019-06-11 PROCEDURE — 87040 BLOOD CULTURE FOR BACTERIA: CPT

## 2019-06-11 PROCEDURE — 83605 ASSAY OF LACTIC ACID: CPT

## 2019-06-11 PROCEDURE — 71045 X-RAY EXAM CHEST 1 VIEW: CPT

## 2019-06-11 RX ORDER — LANOLIN ALCOHOL/MO/W.PET/CERES
3 CREAM (GRAM) TOPICAL NIGHTLY PRN
COMMUNITY

## 2019-06-11 RX ORDER — LORAZEPAM 2 MG/ML
1 INJECTION INTRAMUSCULAR EVERY 30 MIN PRN
Status: COMPLETED | OUTPATIENT
Start: 2019-06-11 | End: 2019-06-11

## 2019-06-11 RX ORDER — ONDANSETRON 2 MG/ML
4 INJECTION INTRAMUSCULAR; INTRAVENOUS EVERY 6 HOURS PRN
Status: DISCONTINUED | OUTPATIENT
Start: 2019-06-11 | End: 2019-06-13 | Stop reason: HOSPADM

## 2019-06-11 RX ORDER — GUAIFENESIN 600 MG/1
1200 TABLET, EXTENDED RELEASE ORAL 2 TIMES DAILY
Status: ON HOLD | COMMUNITY
End: 2019-06-13 | Stop reason: HOSPADM

## 2019-06-11 RX ORDER — CETIRIZINE HYDROCHLORIDE 10 MG/1
10 TABLET ORAL DAILY
COMMUNITY

## 2019-06-11 RX ORDER — SODIUM CHLORIDE 0.9 % (FLUSH) 0.9 %
10 SYRINGE (ML) INJECTION EVERY 12 HOURS SCHEDULED
Status: DISCONTINUED | OUTPATIENT
Start: 2019-06-11 | End: 2019-06-13

## 2019-06-11 RX ORDER — 0.9 % SODIUM CHLORIDE 0.9 %
1000 INTRAVENOUS SOLUTION INTRAVENOUS ONCE
Status: COMPLETED | OUTPATIENT
Start: 2019-06-11 | End: 2019-06-11

## 2019-06-11 RX ORDER — METHYLPREDNISOLONE SODIUM SUCCINATE 125 MG/2ML
125 INJECTION, POWDER, LYOPHILIZED, FOR SOLUTION INTRAMUSCULAR; INTRAVENOUS ONCE
Status: COMPLETED | OUTPATIENT
Start: 2019-06-11 | End: 2019-06-11

## 2019-06-11 RX ORDER — IPRATROPIUM BROMIDE AND ALBUTEROL SULFATE 2.5; .5 MG/3ML; MG/3ML
1 SOLUTION RESPIRATORY (INHALATION) ONCE
Status: COMPLETED | OUTPATIENT
Start: 2019-06-11 | End: 2019-06-11

## 2019-06-11 RX ORDER — SODIUM CHLORIDE 0.9 % (FLUSH) 0.9 %
10 SYRINGE (ML) INJECTION PRN
Status: DISCONTINUED | OUTPATIENT
Start: 2019-06-11 | End: 2019-06-13

## 2019-06-11 RX ADMIN — LORAZEPAM 1 MG: 2 INJECTION INTRAMUSCULAR; INTRAVENOUS at 19:27

## 2019-06-11 RX ADMIN — Medication 1 G: at 19:29

## 2019-06-11 RX ADMIN — SODIUM CHLORIDE 1000 ML: 9 INJECTION, SOLUTION INTRAVENOUS at 19:29

## 2019-06-11 RX ADMIN — IPRATROPIUM BROMIDE AND ALBUTEROL SULFATE 1 AMPULE: .5; 3 SOLUTION RESPIRATORY (INHALATION) at 18:13

## 2019-06-11 RX ADMIN — METHYLPREDNISOLONE SODIUM SUCCINATE 125 MG: 125 INJECTION, POWDER, FOR SOLUTION INTRAMUSCULAR; INTRAVENOUS at 18:00

## 2019-06-11 RX ADMIN — AZITHROMYCIN MONOHYDRATE 500 MG: 500 INJECTION, POWDER, LYOPHILIZED, FOR SOLUTION INTRAVENOUS at 19:45

## 2019-06-11 RX ADMIN — LORAZEPAM 1 MG: 2 INJECTION INTRAMUSCULAR; INTRAVENOUS at 17:35

## 2019-06-11 ASSESSMENT — ENCOUNTER SYMPTOMS
CONSTIPATION: 0
DIARRHEA: 0
TROUBLE SWALLOWING: 0
COUGH: 1
COLOR CHANGE: 0
VOICE CHANGE: 0
ABDOMINAL PAIN: 0
VOMITING: 0
BACK PAIN: 0
SHORTNESS OF BREATH: 1
ABDOMINAL DISTENTION: 0
SORE THROAT: 0
STRIDOR: 0
NAUSEA: 0
WHEEZING: 1

## 2019-06-11 NOTE — ED PROVIDER NOTES
inversions  Non-specific T wave changes: present  Prior EKG comparison: EKG dated 12/15/18 is significantly different due to prior EKG is LBBB pattern from ventricular paced rhythm    MDM:  ED course was notable for agitation, with community-acquired pneumonia consistent with his clinical picture. Patient was given Ativan for anxiolysis as well as Rocephin and azithromycin. Mildly hyponatremic but remainder of blood work is generally unremarkable. Patient was also given steroids and nebulizers in the ED. There is no evidence of endorgan dysfunction or sepsis. The patient is typically agitated and confused which is baseline for his dementia. During the patient's ED course, the patient was given:  Medications   LORazepam (ATIVAN) injection 1 mg (1 mg Intravenous Given 6/11/19 1735)   cefTRIAXone (ROCEPHIN) 1 g in sterile water 10 mL IV syringe (has no administration in time range)   azithromycin (ZITHROMAX) 500 mg in D5W 250ml Vial Mate (has no administration in time range)   0.9 % sodium chloride bolus (has no administration in time range)   ipratropium-albuterol (DUONEB) nebulizer solution 1 ampule (1 ampule Inhalation Given 6/11/19 1813)   methylPREDNISolone sodium (SOLU-MEDROL) injection 125 mg (125 mg Intravenous Given 6/11/19 1800)        CLINICAL IMPRESSION  1. Pneumonia due to organism    2. Hyponatremia    3. Adult failure to thrive        DISPOSITION  Sandie Nobles was admitted in fair condition. I have discussed the findings of today's workup with the patient and addressed the patient's questions and concerns. The plan is to admit to the hospital at this time under the hospitalist service. The advanced practice provider spoke with hospitalist who accepted the patient and will take over the patient's care. The patient is agreeable with this plan. The total critical care time spent while evaluating and treating this patient was at least 33 minutes.   This excludes time spent doing separately billable procedures. This includes time at the bedside, data interpretation, medication management, obtaining critical history from collateral sources if the patient is unable to provide it directly, and physician consultation. Specifics of interventions taken and potentially life-threatening diagnostic considerations are listed above in the medical decision making. This chart was created using Dragon dictation software. Efforts were made by me to ensure accuracy, however some errors may be present due to limitations of this technology.             Agustin Mendoza MD  06/11/19 8504

## 2019-06-11 NOTE — ED NOTES
Bed: 06  Expected date:   Expected time:   Means of arrival:   Comments:  1021 Edgard Martines RN  06/11/19 3729

## 2019-06-11 NOTE — ED PROVIDER NOTES
[facility]        Pt Name: Morena Jarrell  MRN: [id number]  [place] 3/6/1923  Date of evaluation: 6/11/2019  Provider: Bharti Jones  PCP: Fox Burnett [id number]    This patient was seen and evaluated by the attending physician Ludivina Barker MD.      [facility]       Chief Complaint   Patient presents with    Shortness of Breath     pt brought in by [facility] from home with son c/o trouble breathing and coughing up mucous x2 wks. HISTORY OF PRESENT ILLNESS   (Location/Symptom, Timing/Onset, Context/Setting, Quality, Duration, Modifying Factors, Severity)  Note limiting factors. Morena Jarrell is a 80 y.o. male who presents to the emergency department complaining of cough, congestion and shortness of breath. Patient is not a good historian, secondary to his dementia. Daughter has been trying Mucinex without any relief. He does have intermittent chills but denies documented fever. Denies chest pain, abdominal pain, nausea, vomiting, pain or swelling in extremities. Son has noticed some weight loss and poor appetite for the past few weeks. `    Nursing Notes were all reviewed and agreed with or any disagreements were addressed  in the HPI. REVIEW OF SYSTEMS    (2-9 systems for level 4, 10 or more for level 5)     Review of Systems   Constitutional: Positive for appetite change, chills, fatigue and unexpected weight change. Negative for fever. HENT: Positive for congestion. Negative for sore throat, tinnitus, trouble swallowing and voice change. Eyes: Negative for visual disturbance. Respiratory: Positive for cough, shortness of breath and wheezing. Negative for stridor. Cardiovascular: Negative for chest pain, palpitations and leg swelling. Gastrointestinal: Negative for abdominal distention, abdominal pain, constipation, diarrhea, nausea and vomiting.    Musculoskeletal: Negative for back pain, neck pain and neck stiffness. Skin: Negative for color change, pallor, rash and wound. Neurological: Positive for weakness. Negative for dizziness, tremors, seizures, syncope, facial asymmetry, speech difficulty, light-headedness, numbness and headaches. Psychiatric/Behavioral: Negative for confusion (baseline according to daughter). All other systems reviewed and are negative. Positives and Pertinent negatives as per HPI. Except as noted abovein the ROS, all other systems were reviewed and negative. PAST MEDICAL HISTORY     Past Medical History:   Diagnosis Date    A-fib ([facility]     Cancer ([facility]     colon    Hyperlipemia     Hypertension     Thyroid disease          SURGICAL HISTORY     Past Surgical History:   Procedure Laterality Date    COLECTOMY      r/t ca    PACEMAKER PLACEMENT           CURRENTMEDICATIONS       Previous Medications    CETIRIZINE (ZYRTEC) 10 MG TABLET    Take 10 mg by mouth daily    GUAIFENESIN (MUCINEX) 600 MG EXTENDED RELEASE TABLET    Take 1,200 mg by mouth 2 times daily    IPRATROPIUM-ALBUTEROL (DUONEB) 0.5-2.5 (3) MG/3ML SOLN NEBULIZER SOLUTION    Inhale 3 mLs into the lungs every 4 hours as needed for Shortness of Breath    LEVOTHYROXINE (SYNTHROID) 100 MCG TABLET    Take 100 mcg by mouth Daily    MELATONIN 3 MG TABS TABLET    Take 3 mg by mouth nightly as needed    MIRTAZAPINE (REMERON) 15 MG TABLET    Take 7.5 mg by mouth nightly          ALLERGIES     Patient has no known allergies. FAMILYHISTORY     No family history on file. SOCIAL HISTORY       Social History     Socioeconomic History    Marital status:       Spouse name: Not on file    Number of children: Not on file    Years of education: Not on file    Highest education level: Not on file   Occupational History    Not on file   Social Needs    Financial resource strain: Not on file    Food insecurity:     Worry: Not on file     Inability: Not on file    Transportation needs: Medical: Not on file     Non-medical: Not on file   Tobacco Use    Smoking status: Never Smoker    Smokeless tobacco: Never Used   Substance and Sexual Activity    Alcohol use: Yes     Comment: occasional    Drug use: No    Sexual activity: Not Currently   Lifestyle    Physical activity:     Days per week: Not on file     Minutes per session: Not on file    Stress: Not on file   Relationships    Social connections:     Talks on phone: Not on file     Gets together: Not on file     Attends Bahai service: Not on file     Active member of club or organization: Not on file     Attends meetings of clubs or organizations: Not on file     Relationship status: Not on file    Intimate partner violence:     Fear of current or ex partner: Not on file     Emotionally abused: Not on file     Physically abused: Not on file     Forced sexual activity: Not on file   Other Topics Concern    Not on file   Social History Narrative    Not on file       SCREENINGS             PHYSICAL EXAM    (up to 7 for level 4, 8 or more for level 5)     ED Triage Vitals [06/11/19 1716]   BP Temp Temp Source Pulse Resp SpO2 Height Weight   126/78 97.1 °F (36.2 °C) Infrared 75 26 98 % 6' (1.829 m) 130 lb (59 kg)       Physical Exam   Constitutional: He appears well-developed and well-nourished. No distress. HENT:   Head: Normocephalic and atraumatic. Right Ear: External ear normal.   Left Ear: External ear normal.   Nose: Nose normal.   Mouth/Throat: Uvula is midline. Mucous membranes are dry. Tongue is very dry. Eyes: Pupils are equal, round, and reactive to light. Conjunctivae and EOM are normal. Right eye exhibits no discharge. Left eye exhibits no discharge. No scleral icterus. Neck: Normal range of motion. No JVD present. Cardiovascular: Normal rate. Pulmonary/Chest: Effort normal. He has wheezes (diffusely scattered bilateral). Abdominal: Soft. Bowel sounds are normal. He exhibits no distension.  There is no tenderness. Musculoskeletal: Normal range of motion. Lymphadenopathy:     He has no cervical adenopathy. Neurological: He is alert. Oriented to person  Symmetrical upper and lower extremity strength without asymmetric weakness  Does not follow commands well   Skin: Skin is warm and dry. Capillary refill takes less than 2 seconds. No rash noted. He is not diaphoretic. No erythema. No pallor. Psychiatric: His speech is normal. His mood appears anxious. He is agitated (demented and agitated). He is not actively hallucinating. Cognition and memory are impaired (baseline according to family). He expresses impulsivity. He is attentive. Nursing note and vitals reviewed.       DIAGNOSTIC RESULTS   LABS:    Labs Reviewed   CBC WITH AUTO DIFFERENTIAL - Abnormal; Notable for the following components:       Result Value    Hemoglobin 13.3 (*)     Hematocrit 40.1 (*)     Neutrophils # 7.8 (*)     All other components within normal limits    Narrative:     Performed at:  [facility] [address], [place], [address] [facility]   Phone [phone]   COMPREHENSIVE METABOLIC PANEL W/ REFLEX TO MG FOR LOW K - Abnormal; Notable for the following components:    Sodium 128 (*)     Chloride 92 (*)     Glucose 106 (*)     CREATININE 0.7 (*)     Albumin/Globulin Ratio 0.9 (*)     Total Bilirubin 2.1 (*)     All other components within normal limits    Narrative:     Performed at:  [facility] [address], [place], [address] [facility]   Phone [phone]   PROTIME-INR - Abnormal; Notable for the following components:    Protime 15.5 (*)     INR 1.36 (*)     All other components within normal limits    Narrative:     Performed at:  [facility] [address], [place], [address] [facility]   Phone [phone]  - Abnormal; Notable for the following components:    Pro-BNP 2,652 (*)     All other components within normal 1735)   cefTRIAXone (ROCEPHIN) 1 g in sterile water 10 mL IV syringe (has no administration in time range)   azithromycin (ZITHROMAX) 500 mg in D5W 250ml Vial Mate (has no administration in time range)   0.9 % sodium chloride bolus (has no administration in time range)   ipratropium-albuterol (DUONEB) nebulizer solution 1 ampule (1 ampule Inhalation Given 6/11/19 1813)   methylPREDNISolone sodium (SOLU-MEDROL) injection 125 mg (125 mg Intravenous Given 6/11/19 1800)       This patient presents to the emergency department with persistent cough despite over-the-counter medications that family has been giving him. His strength has declined over the past few days and he has poor appetite. Family is worried about him going back home. Chest x-ray shows evidence of pneumonia, therefore antibiotics ordered. He is considered community-acquired pneumonia. He does have evidence of hyponatremia as well, which could be nutritional due to poor appetite. Vitals are stable at this time. We have addressed concerns and expectations. FINAL IMPRESSION      1. Pneumonia due to organism    2. Hyponatremia    3. Adult failure to thrive          DISPOSITION/PLAN   DISPOSITION Decision To Admit 06/11/2019 06:52:13 PM      PATIENT REFERREDTO:  No follow-up provider specified.     DISCHARGE MEDICATIONS:  New Prescriptions    No medications on file       DISCONTINUED MEDICATIONS:  Discontinued Medications    ASPIRIN 81 MG EC TABLET    Take 1 tablet by mouth daily    ATROPINE 1 % OPHTHALMIC SOLUTION    1 drop 3 times daily    CARBOXYMETHYLCELLULOSE 1 % OPHTHALMIC SOLUTION    Place 1 drop into both eyes 3 times daily    CETIRIZINE (ZYRTEC) 5 MG TABLET    Take 5 mg by mouth daily    QUETIAPINE (SEROQUEL XR) 50 MG EXTENDED RELEASE TABLET    Take 25 mg by mouth nightly    SIMVASTATIN (ZOCOR) 80 MG TABLET    Take 80 mg by mouth nightly               (Please note that portions ofthis note were completed with a voice recognition program.

## 2019-06-12 PROBLEM — J18.9 PNEUMONIA: Status: ACTIVE | Noted: 2019-06-12

## 2019-06-12 LAB
BILIRUBIN URINE: NEGATIVE
BLOOD, URINE: ABNORMAL
CLARITY: CLEAR
COLOR: YELLOW
EKG ATRIAL RATE: 87 BPM
EKG DIAGNOSIS: NORMAL
EKG Q-T INTERVAL: 380 MS
EKG QRS DURATION: 66 MS
EKG QTC CALCULATION (BAZETT): 427 MS
EKG R AXIS: 31 DEGREES
EKG T AXIS: 13 DEGREES
EKG VENTRICULAR RATE: 76 BPM
EPITHELIAL CELLS, UA: 0 /HPF (ref 0–5)
GLUCOSE URINE: NEGATIVE MG/DL
HYALINE CASTS: 0 /LPF (ref 0–8)
KETONES, URINE: NEGATIVE MG/DL
LEUKOCYTE ESTERASE, URINE: NEGATIVE
MICROSCOPIC EXAMINATION: YES
NITRITE, URINE: NEGATIVE
PH UA: 6 (ref 5–8)
PROTEIN UA: NEGATIVE MG/DL
RBC UA: 5 /HPF (ref 0–4)
REPORT: NORMAL
SPECIFIC GRAVITY UA: 1.01 (ref 1–1.03)
TSH REFLEX: 4.12 UIU/ML (ref 0.27–4.2)
URINE REFLEX TO CULTURE: ABNORMAL
URINE TYPE: ABNORMAL
UROBILINOGEN, URINE: 1 E.U./DL
WBC UA: 0 /HPF (ref 0–5)

## 2019-06-12 PROCEDURE — 92526 ORAL FUNCTION THERAPY: CPT

## 2019-06-12 PROCEDURE — 1200000000 HC SEMI PRIVATE

## 2019-06-12 PROCEDURE — 6370000000 HC RX 637 (ALT 250 FOR IP): Performed by: NURSE PRACTITIONER

## 2019-06-12 PROCEDURE — 2580000003 HC RX 258: Performed by: INTERNAL MEDICINE

## 2019-06-12 PROCEDURE — 6360000002 HC RX W HCPCS: Performed by: INTERNAL MEDICINE

## 2019-06-12 PROCEDURE — 81001 URINALYSIS AUTO W/SCOPE: CPT

## 2019-06-12 PROCEDURE — 92610 EVALUATE SWALLOWING FUNCTION: CPT

## 2019-06-12 PROCEDURE — 93010 ELECTROCARDIOGRAM REPORT: CPT | Performed by: INTERNAL MEDICINE

## 2019-06-12 RX ORDER — HALOPERIDOL 1 MG/1
0.25 TABLET ORAL EVERY 6 HOURS PRN
Status: DISCONTINUED | OUTPATIENT
Start: 2019-06-12 | End: 2019-06-13 | Stop reason: HOSPADM

## 2019-06-12 RX ORDER — LORAZEPAM 2 MG/ML
1 INJECTION INTRAMUSCULAR EVERY 4 HOURS PRN
Status: DISCONTINUED | OUTPATIENT
Start: 2019-06-12 | End: 2019-06-13 | Stop reason: HOSPADM

## 2019-06-12 RX ORDER — VANCOMYCIN HYDROCHLORIDE 1 G/200ML
1000 INJECTION, SOLUTION INTRAVENOUS EVERY 24 HOURS
Status: DISCONTINUED | OUTPATIENT
Start: 2019-06-12 | End: 2019-06-12 | Stop reason: DRUGHIGH

## 2019-06-12 RX ORDER — SODIUM CHLORIDE, SODIUM LACTATE, POTASSIUM CHLORIDE, CALCIUM CHLORIDE 600; 310; 30; 20 MG/100ML; MG/100ML; MG/100ML; MG/100ML
INJECTION, SOLUTION INTRAVENOUS CONTINUOUS
Status: DISCONTINUED | OUTPATIENT
Start: 2019-06-12 | End: 2019-06-13 | Stop reason: HOSPADM

## 2019-06-12 RX ORDER — IPRATROPIUM BROMIDE AND ALBUTEROL SULFATE 2.5; .5 MG/3ML; MG/3ML
1 SOLUTION RESPIRATORY (INHALATION) EVERY 4 HOURS PRN
Status: DISCONTINUED | OUTPATIENT
Start: 2019-06-12 | End: 2019-06-13 | Stop reason: HOSPADM

## 2019-06-12 RX ADMIN — NYSTATIN 500000 UNITS: 100000 SUSPENSION ORAL at 17:46

## 2019-06-12 RX ADMIN — Medication 1 G: at 17:39

## 2019-06-12 RX ADMIN — Medication 10 ML: at 08:42

## 2019-06-12 RX ADMIN — NYSTATIN 500000 UNITS: 100000 SUSPENSION ORAL at 11:56

## 2019-06-12 RX ADMIN — NYSTATIN 500000 UNITS: 100000 SUSPENSION ORAL at 20:12

## 2019-06-12 RX ADMIN — Medication 10 ML: at 04:00

## 2019-06-12 RX ADMIN — LORAZEPAM 1 MG: 2 INJECTION INTRAMUSCULAR; INTRAVENOUS at 23:09

## 2019-06-12 RX ADMIN — SODIUM CHLORIDE, POTASSIUM CHLORIDE, SODIUM LACTATE AND CALCIUM CHLORIDE: 600; 310; 30; 20 INJECTION, SOLUTION INTRAVENOUS at 17:39

## 2019-06-12 RX ADMIN — Medication 10 ML: at 20:12

## 2019-06-12 RX ADMIN — LORAZEPAM 1 MG: 2 INJECTION INTRAMUSCULAR; INTRAVENOUS at 18:31

## 2019-06-12 RX ADMIN — Medication 10 ML: at 23:09

## 2019-06-12 RX ADMIN — ENOXAPARIN SODIUM 40 MG: 40 INJECTION SUBCUTANEOUS at 08:42

## 2019-06-12 RX ADMIN — SODIUM CHLORIDE, POTASSIUM CHLORIDE, SODIUM LACTATE AND CALCIUM CHLORIDE: 600; 310; 30; 20 INJECTION, SOLUTION INTRAVENOUS at 05:42

## 2019-06-12 RX ADMIN — AZITHROMYCIN MONOHYDRATE 500 MG: 500 INJECTION, POWDER, LYOPHILIZED, FOR SOLUTION INTRAVENOUS at 17:39

## 2019-06-12 RX ADMIN — Medication 10 ML: at 05:42

## 2019-06-12 ASSESSMENT — PAIN SCALES - GENERAL: PAINLEVEL_OUTOF10: 0

## 2019-06-12 NOTE — PROGRESS NOTES
Per pharmacy, po haldol on nationwide backorder. MD paged for another route or medication for agitation. Family at bedside requesting medication for patient.

## 2019-06-12 NOTE — PROGRESS NOTES
[facility] PROGRESS NOTE    6/12/2019 2:28 PM        Name: Kerwin Sneed . Admitted: 6/11/2019  Primary Care Provider: Fox Burnett 5657 (Tel: None)      Subjective: Cata Vera Pt seen this am at request of RN due to restlessness. He was calm during my visit, however daughter and son in law were at the bedside and provided history due to his advanced dementia    He is currently pleasant and cooperative. Has loose non productive cough     Reviewed interval ancillary notes    Current Medications    cefTRIAXone (ROCEPHIN) 1 g in sterile water 10 mL IV syringe Q24H   azithromycin (ZITHROMAX) 500 mg in D5W 250ml Vial Mate Q24H   lactated ringers infusion Continuous   nystatin (MYCOSTATIN) 642562 UNIT/ML suspension 500,000 Units 4x Daily   haloperidol (HALDOL) tablet 0.25 mg Q6H PRN   ipratropium-albuterol (DUONEB) nebulizer solution 1 ampule Q4H PRN   sodium chloride flush 0.9 % injection 10 mL 2 times per day   sodium chloride flush 0.9 % injection 10 mL PRN   ondansetron (ZOFRAN) injection 4 mg Q6H PRN   enoxaparin (LOVENOX) injection 40 mg Daily       Objective:  /65   Pulse 61   Temp 97.6 °F (36.4 °C) (Temporal)   Resp 18   Ht 6' (1.829 m)   Wt 159 lb 6.4 oz (72.3 kg)   SpO2 96%   BMI 21.62 kg/m²     Intake/Output Summary (Last 24 hours) at 6/12/2019 1428  Last data filed at 6/12/2019 0939  Gross per 24 hour   Intake 1100 ml   Output 275 ml   Net 825 ml    Wt Readings from Last 3 Encounters:   06/11/19 159 lb 6.4 oz (72.3 kg)   12/16/18 141 lb 8 oz (64.2 kg)   09/14/18 158 lb 3.2 oz (71.8 kg)       General appearance:  Appears comfortable in bed but chronically ill   Eyes: Sclera clear. Pupils equal.  ENT: Moist oral mucosa. Trachea midline, no adenopathy. Only Brick noted on tongue   Cardiovascular: Regular rhythm, normal S1, S2. No murmur.  No edema in lower extremities  Respiratory: Not using accessory muscles. few ronchi,  Moist non productive cough  GI: Abdomen soft, no tenderness, not distended, normal bowel sounds  Musculoskeletal: No cyanosis in digits, neck supple  Neurology: CN 2-12 grossly intact. No speech or motor deficits  Psych: pt with dementia, knows family members. Answers appropriately at intervals, currently cooperative   Skin: Warm, dry, normal turgor    Labs and Tests:  CBC:   Recent Labs     06/11/19  1728   WBC 9.8   HGB 13.3*        BMP:  Recent Labs     06/11/19  1728   *   K 5.1   CL 92*   CO2 26   BUN 18   CREATININE 0.7*   GLUCOSE 106*     Hepatic: Recent Labs     06/11/19  1728   AST 28   ALT 14   BILITOT 2.1*   ALKPHOS 95         Problem List  Active Problems:    Social problem    Pneumonia  Resolved Problems:    * No resolved hospital problems. *       Assessment & Plan:   1. Probable aspiration PNA:  Currently on rocephin and zithromax. Will likely transition to levaquin at discharge. Speech will follow  2. Advanced dementia:  Pt restless at intervals. I had detailed conversation with the family that lasted > 30 minutes. Will start low dose prn haldol. 3. Thrush:  Nystatin ordered  4. Hyponatremia:  Daughter reports she has been pushing fluids at  Home. Will recheck labs in am ( family does not want blood draw today)  5. Pt is DNR CC and lives with daughter who is primary care giver at home. Pt is apparently ambulatory. She is requesting PT/OT evaluations. She is receiving a HHA 6 hours per day and is also getting home services through the [facility]. She has hospital bed, BSC, walker etc at home  6. I don't anticipate a long stay,  Likely home in the next 1 - 2 days. If IV infiltrates may leave out and transition to oral levaquin      Diet: DIET DYSPHAGIA I PUREED; Honey Thick;  No Drinking Straw  Dietary Nutrition Supplements: Standard High Calorie Oral Supplement  Code:DNR-CC  DVT PPX      IRINA Erwin CNP   6/12/2019 2:28 PM

## 2019-06-12 NOTE — PROGRESS NOTES
CLINICAL BEDSIDE SWALLOWING EVALUATION  Speech Therapy Department    Patient Name:  Geoff Pierson  :  3/6/1923  Pain level: Pt did not report pain  Medical Diagnosis:   Social problem [Z65.9]  Social problem [Z65.9]  Pneumonia [J18.9]     HPI: Per chart: This 49-year-old  male with a history of advanced dementia which is progressively getting worse, presents to the hospital with chief complaints of what the daughter describes as subacute onset of gradually progressive increasing cough for weeks which is now also associated with greenish sputum, for which she was giving him some Mucinex but that did not do anything, so she brought him here. The daughter also notes that for the past couple of days, the patient has not been eating or drinking well, and she is worried about that. No fevers or chills. No other constitutional symptoms. Today, the daughter does note that the patient had some fevers or chills before. Chest x-ray:  Suspect right perihilar airspace disease. Dysphagia History: Pt has been seen by speech therapy in the past, most recently he was seen in 2018 with a Dysphgia II diet with nectar thick liquids recommended. It is unclear what diet Pt was consuming at home prior to this admission. Treatment Diagnosis: Moderate-Severe Oropharyngeal Dysphagia    Impressions:  Pt was seen sitting upright in bed, he was confused during session. Per chart, Pt has advanced dementia. Various textures were provided to assess swallowing function. Pt presents with moderate-severe oropharyngeal dysphagia characterized by prolonged mastication, reduced bolus control, rapid bolus loss to pharynx, delayed swallow initiation, reduced laryngeal elevation upon manual palpation, suspected reduced pharyngeal clearing, with delayed coughing with thin liquids, and delayed throat clearing with nectar thick liquids.   Thin liquids via cup revealed rapid bolus loss to pharynx, delayed swallow initiation was with thin liquids  Throat clearing (delayed) with nectar thick liquids and soft solids     Patient/Family Education:Education, results and recommendations given to the Pt (no evidence of learning) and nurse, who verbalized understanding    Timed Code Treatment: 0 minutes    Total Treatment Time: 25 minutes     Discharge Recommendations: Speech Therapy for Speech/Dysphagia treatment at discharge.     Bruno Ewing M.A., [address] [place] [address]  Speech-Language Pathologist

## 2019-06-12 NOTE — ED NOTES
Report to RN on 5T. No further questions. Azithromycin and iv fluids infusing at time of transport.  Electronically signed by Niles Ku RN on 6/11/2019 at 8:01 PM       Niles Ku RN  06/11/19 2001

## 2019-06-12 NOTE — PROGRESS NOTES
4 Eyes Skin Assessment     The patient is being assess for  Admission    I agree that 2 RN's have performed a thorough Head to Toe Skin Assessment on the patient. ALL assessment sites listed below have been assessed. Areas assessed by both nurses:   [x]   Head, Face, and Ears   [x]   Shoulders, Back, and Chest  [x]   Arms, Elbows, and Hands   [x]   Coccyx, Sacrum, and IschIum  [x]   Legs, Feet, and Heels        Does the Patient have Skin Breakdown?   No         Moe Prevention initiated:  Yes   Wound Care Orders initiated:  No      [facility] nurse consulted for Pressure Injury (Stage 3,4, Unstageable, DTI, NWPT, and Complex wounds), New and Established Ostomies:  No      Nurse 1 eSignature: Electronically signed by Nannette Sánchez RN on 6/12/19 at 6:27 AM    **SHARE this note so that the co-signing nurse is able to place an eSignature**    Nurse 2 eSignature: Electronically signed by Adrienne Torrez RN on 6/12/19 at 6:44 AM

## 2019-06-12 NOTE — PROGRESS NOTES
Shift assessment completed. VSS. Patient agitated attempting to get oob. Alert but disoriented x3. Speech incomprehensible. MD paged for something for agitation. Bathed and new gown placed. Bed alarm engaged, call light in reach. Will monitor.

## 2019-06-12 NOTE — PROGRESS NOTES
Arrived on unit via cart. Pt asleep and nonverbal at this time. Pt does moan at times. No signs of distress, labored breathing or pain noted. transferred from cart to bed via x2 assist. Tolerated well. Assessments complete. VS stable. 4 eye complete. Oriented to room and call light. Bed in lowest position with alarm in place. Call light within reach.

## 2019-06-12 NOTE — H&P
[facility]                     [facility], [address]                              HISTORY AND PHYSICAL    PATIENT NAME: Venita Rascon                       :        1923  MED REC NO:   [id number]                          ROOM:       [id number]  ACCOUNT NO:   [de-identified]                           ADMIT DATE: 2019  PROVIDER:     Shikha Edmonds MD    I obtained the history and performed the physical exam on the patient on  the medical floor on 2019. SOURCE OF HISTORY:  ER documentation and the patient's family. The  patient is unable to provide any history because of advanced dementia. CHIEF COMPLAINT:  Cough. HISTORY OF PRESENT ILLNESS:  This 78-year-old  male with a  history of advanced dementia which is progressively getting worse,  presents to the hospital with chief complaints of what the daughter  describes as subacute onset of gradually progressive increasing cough  for weeks which is now also associated with greenish sputum, for which  she was giving him some Mucinex but that did not do anything, so she  brought him here. The daughter also notes that for the past couple of  days, the patient has not been eating or drinking well, and she is  worried about that. No fevers or chills. No other constitutional  symptoms. Today, the daughter does note that the patient had some  fevers or chills before. PAST MEDICAL/PAST SURGICAL HISTORY:  1. Advanced dementia. 2.  Hypertension. 3.  Hypothyroidism. 4.  The patient had bowel obstruction recently. ALLERGIC HISTORY:  No known allergies. FAMILY HISTORY:  Reviewed by me and is currently noncontributory. SOCIAL HISTORY:  Lives currently with the family at home. No illicit  substance use. MEDICATIONS:  1.  Guaifenesin. 2.  Melatonin. 3.  Cetirizine. 4.  DuoNeb. 5.  Synthroid. 6.  Remeron.     REVIEW OF SYSTEMS:  The patient's review of systems is unobtainable from  the patient because of his advanced dementia. PHYSICAL EXAMINATION:  VITAL SIGNS:  Temperature 97.1, respiratory rate 26, pulse 75, blood  pressure 126/78, and saturating 98%. CNS:  The patient is arousable, but not verbal, has got advanced  dementia. PSYCHIATRIC:  The patient does get agitated, did receive 2 mg of IV  Ativan in the emergency room. EYES:  Pupils are bilaterally equal.  ENT:  No oral mucosal lesions. RESPIRATORY SYSTEM:  No rales or rhonchi. CVS:  S1 and S2 are heard. No murmurs or rubs. ABDOMEN:  Soft. MUSCULOSKELETAL:  No acute deformities. The patient does appear to have  some contractures. SKIN:  No open scars noted. DIAGNOSTIC DATA:  Portable chest x-ray shows right perihilar air space  disease. Troponin 0.01. BUN 18, creatinine 0.7. Sodium 128, potassium  5.1, *------*. INR 1.06. CBC shows hemoglobin 13.2, hematocrit 40.1. Chest x-ray shows right perihilar airspace disease. EKG independently  reviewed by me shows the patient's underlying rhythm is atrial  fibrillation, rate is around 76 beats per minute. ASSESSMENT:  1. Community acquired bacterial pneumonia. 2.  Advanced dementia. 3.  Atrial fibrillation. PLAN OF CARE:  The patient has been admitted to the Internal Medicine  Service. Ceftriaxone and azithromycin IV. IV hydration as necessary  very cautiously. N.P.O. status to be continued. DVT prophylaxis with Lovenox. CODE STATUS:  DNRCC. This was discussed by me with the patient's  daughter who is a POA. EXPECTED LENGTH OF STAY:  More than two midnights based on the plan of  care above. RISK:  High due to the patient's advanced dementia. PROGNOSIS:  Poor.         Aleksander Manning MD    D: 06/12/2019 4:45:24       T: 06/12/2019 6:19:01     [email or web address]  Job#: [id number]     Doc#: [id number]    CC:  <>

## 2019-06-12 NOTE — PLAN OF CARE
Problem: Falls - Risk of:  Goal: Will remain free from falls  Description  Will remain free from falls  6/12/2019 0728 by Jacquelin Plummer RN  Outcome: Ongoing  6/12/2019 0345 by Abeba Morton RN  Outcome: Ongoing  Goal: Absence of physical injury  Description  Absence of physical injury  6/12/2019 0728 by Jacquelin Plummer RN  Outcome: Ongoing  6/12/2019 0345 by Abeba Morton RN  Outcome: Ongoing     Problem:  Activity:  Goal: Fatigue will decrease  Description  Fatigue will decrease  Outcome: Ongoing     Problem: Coping:  Goal: Ability to identify and utilize available resources and services will improve  Description  Ability to identify and utilize available resources and services will improve  Outcome: Ongoing

## 2019-06-12 NOTE — PROGRESS NOTES
Ativan given at this time per MD order. Denies additional needs. Bed alarm engaged, call light in reach. Will monitor. [employer] camera at bedside.

## 2019-06-13 VITALS
HEIGHT: 72 IN | TEMPERATURE: 97.4 F | DIASTOLIC BLOOD PRESSURE: 83 MMHG | WEIGHT: 159.4 LBS | HEART RATE: 61 BPM | OXYGEN SATURATION: 96 % | BODY MASS INDEX: 21.59 KG/M2 | SYSTOLIC BLOOD PRESSURE: 147 MMHG | RESPIRATION RATE: 16 BRPM

## 2019-06-13 LAB
ALBUMIN SERPL-MCNC: 3.2 G/DL (ref 3.4–5)
ANION GAP SERPL CALCULATED.3IONS-SCNC: 9 MMOL/L (ref 3–16)
BASOPHILS ABSOLUTE: 0 K/UL (ref 0–0.2)
BASOPHILS RELATIVE PERCENT: 0.1 %
BUN BLDV-MCNC: 20 MG/DL (ref 7–20)
CALCIUM SERPL-MCNC: 9.1 MG/DL (ref 8.3–10.6)
CHLORIDE BLD-SCNC: 96 MMOL/L (ref 99–110)
CO2: 26 MMOL/L (ref 21–32)
CREAT SERPL-MCNC: 0.8 MG/DL (ref 0.8–1.3)
EOSINOPHILS ABSOLUTE: 0 K/UL (ref 0–0.6)
EOSINOPHILS RELATIVE PERCENT: 0 %
GFR AFRICAN AMERICAN: >60
GFR NON-AFRICAN AMERICAN: >60
GLUCOSE BLD-MCNC: 118 MG/DL (ref 70–99)
HCT VFR BLD CALC: 38.2 % (ref 40.5–52.5)
HEMOGLOBIN: 12.8 G/DL (ref 13.5–17.5)
LYMPHOCYTES ABSOLUTE: 0.9 K/UL (ref 1–5.1)
LYMPHOCYTES RELATIVE PERCENT: 9.3 %
MCH RBC QN AUTO: 31.4 PG (ref 26–34)
MCHC RBC AUTO-ENTMCNC: 33.4 G/DL (ref 31–36)
MCV RBC AUTO: 94.1 FL (ref 80–100)
MONOCYTES ABSOLUTE: 0.7 K/UL (ref 0–1.3)
MONOCYTES RELATIVE PERCENT: 7.3 %
NEUTROPHILS ABSOLUTE: 8.2 K/UL (ref 1.7–7.7)
NEUTROPHILS RELATIVE PERCENT: 83.3 %
PDW BLD-RTO: 13.9 % (ref 12.4–15.4)
PHOSPHORUS: 2.6 MG/DL (ref 2.5–4.9)
PLATELET # BLD: 212 K/UL (ref 135–450)
PMV BLD AUTO: 7.1 FL (ref 5–10.5)
POTASSIUM SERPL-SCNC: 4.4 MMOL/L (ref 3.5–5.1)
RBC # BLD: 4.06 M/UL (ref 4.2–5.9)
SODIUM BLD-SCNC: 131 MMOL/L (ref 136–145)
WBC # BLD: 9.9 K/UL (ref 4–11)

## 2019-06-13 PROCEDURE — 6360000002 HC RX W HCPCS: Performed by: INTERNAL MEDICINE

## 2019-06-13 PROCEDURE — 85025 COMPLETE CBC W/AUTO DIFF WBC: CPT

## 2019-06-13 PROCEDURE — 97530 THERAPEUTIC ACTIVITIES: CPT

## 2019-06-13 PROCEDURE — 97166 OT EVAL MOD COMPLEX 45 MIN: CPT

## 2019-06-13 PROCEDURE — 6370000000 HC RX 637 (ALT 250 FOR IP): Performed by: NURSE PRACTITIONER

## 2019-06-13 PROCEDURE — 80069 RENAL FUNCTION PANEL: CPT

## 2019-06-13 PROCEDURE — 97162 PT EVAL MOD COMPLEX 30 MIN: CPT

## 2019-06-13 PROCEDURE — 2580000003 HC RX 258: Performed by: INTERNAL MEDICINE

## 2019-06-13 PROCEDURE — 2580000003 HC RX 258: Performed by: NURSE PRACTITIONER

## 2019-06-13 PROCEDURE — 36415 COLL VENOUS BLD VENIPUNCTURE: CPT

## 2019-06-13 PROCEDURE — 6360000002 HC RX W HCPCS: Performed by: NURSE PRACTITIONER

## 2019-06-13 PROCEDURE — 94760 N-INVAS EAR/PLS OXIMETRY 1: CPT

## 2019-06-13 RX ORDER — HALOPERIDOL 5 MG
2.5 TABLET ORAL EVERY 6 HOURS PRN
Qty: 30 TABLET | Refills: 0 | Status: SHIPPED | OUTPATIENT
Start: 2019-06-13

## 2019-06-13 RX ORDER — LEVOFLOXACIN 500 MG/1
500 TABLET, FILM COATED ORAL DAILY
Qty: 6 TABLET | Refills: 0 | Status: SHIPPED | OUTPATIENT
Start: 2019-06-14 | End: 2019-06-20

## 2019-06-13 RX ORDER — LEVOFLOXACIN 500 MG/1
500 TABLET, FILM COATED ORAL DAILY
Status: DISCONTINUED | OUTPATIENT
Start: 2019-06-13 | End: 2019-06-13 | Stop reason: HOSPADM

## 2019-06-13 RX ADMIN — VANCOMYCIN HYDROCHLORIDE 750 MG: 750 INJECTION, POWDER, LYOPHILIZED, FOR SOLUTION INTRAVENOUS at 03:52

## 2019-06-13 RX ADMIN — NYSTATIN 500000 UNITS: 100000 SUSPENSION ORAL at 08:52

## 2019-06-13 RX ADMIN — LORAZEPAM 1 MG: 2 INJECTION INTRAMUSCULAR; INTRAVENOUS at 05:23

## 2019-06-13 RX ADMIN — LEVOFLOXACIN 500 MG: 500 TABLET, FILM COATED ORAL at 10:38

## 2019-06-13 RX ADMIN — ENOXAPARIN SODIUM 40 MG: 40 INJECTION SUBCUTANEOUS at 08:52

## 2019-06-13 RX ADMIN — Medication 10 ML: at 05:23

## 2019-06-13 NOTE — PROGRESS NOTES
Clinical Pharmacy Note:    Pharmacy consulted to dose Vancomycin for Staph infection. Age: 80  Height: 6'  Weight: 72.3 kg  Scr= 0.7 mg/dL      Started Vancomycin 750 IV q12. Trough ordered before 4th dose (6/14/2019 @1130) with an order to hold if trough greater than 20mcg/ml. Thanks for the consult!   Ana Gr, BhupendraD, [facility]

## 2019-06-13 NOTE — PROGRESS NOTES
Occupational Therapy   Occupational Therapy Initial Assessment  Date: 2019   Patient Name: Salvatore Alfredo  MRN: [id number]     : 3/6/1923    Date of Service: 2019    Discharge Recommendations:     OT Equipment Recommendations  Equipment Needed: Yes  Mobility Devices: ADL Assistive Devices; Wheelchair  Wheelchair: Wheelchair Cushion Pressure Relieving;Recliner  ADL Assistive Devices: Toileting - Drop Arm Commode, Heavy Duty Drop Arm Commode   Salvatore Alfredo scored a  on the AM-PAC ADL Inpatient form. Current research shows that an AM-PAC score of 17 or less is typically not associated with a discharge to the patient's home setting. Based on the patients AM-PAC score and their current ADL deficits, it is recommended that the patient have 3-5 sessions per week of Occupational Therapy at d/c to increase the patients independence. Assessment   Performance deficits / Impairments: Decreased functional mobility ; Decreased cognition;Decreased high-level IADLs;Decreased ADL status; Decreased endurance;Decreased fine motor control;Decreased ROM; Decreased coordination;Decreased strength;Decreased balance;Decreased safe awareness  Treatment Diagnosis: decreased independence with ADL related to late affects of pneumonia  Prognosis: Good  Decision Making: Medium Complexity  Exam: ADL, mobility, transfers, cognition, home environment  Assistance / Modification: physical assist of two, recliner  Patient Education: OT and plan of care  Barriers to Learning: cognition  REQUIRES OT FOLLOW UP: Yes  Activity Tolerance  Activity Tolerance: Treatment limited secondary to agitation;Treatment limited secondary to decreased cognition  Activity Tolerance: patient becomes aggitated with mobility, easily over stimulated  Safety Devices  Safety Devices in place: Yes  Type of devices: All fall risk precautions in place; Patient at risk for falls;Call light within reach; Left in chair;Chair alarm in place;Nurse notified Impaired  Orientation Level: Oriented to person  Observation/Palpation  Posture: Poor(Simultaneous filing. User may not have seen previous data.)  Observation: significant kyphosis  Balance  Sitting Balance: Maximum assistance  Standing Balance: Unable to assess(comment)  ADL  Feeding: Thickened liquids; Increased time to complete;Maximum assistance;Bringing food to mouth assist;Scoop assist;Beverage management  Additional Comments: dependent throughout self care due to limited mobility  Tone RUE  RUE Tone: Normotonic  Tone LUE  LUE Tone: Normotonic  Coordination  Movements Are Fluid And Coordinated: No  Coordination and Movement description: Fine motor impairments;Gross motor impairments;Decreased accuracy; Right UE;Left UE;Decreased speed     Bed mobility  Supine to Sit: Dependent/Total  Sit to Supine: 2 Person assistance  Scooting: Dependent/Total;2 Person assistance  Comment: max of two to edge of bed  Transfers  Sit Pivot Transfers: Dependent/Total;2 Person assistance  Sit to stand: Unable to assess  Stand to sit: Unable to assess  Transfer Comments: max assist scoot pivot to drop arm recliner     Cognition  Overall Cognitive Status: Exceptions  Arousal/Alertness: Inconsistent responses to stimuli  Following Commands: Inconsistently follows commands  Attention Span: Unable to maintain attention  Memory: Decreased short term memory;Decreased recall of recent events;Decreased recall of precautions;Decreased recall of biographical Information  Safety Judgement: Decreased awareness of need for assistance;Decreased awareness of need for safety  Problem Solving: Assistance required to generate solutions;Assistance required to implement solutions;Decreased awareness of errors;Assistance required to correct errors made;Assistance required to identify errors made  Insights: Not aware of deficits  Initiation: Requires cues for all  Sequencing: Requires cues for all  Perception  Overall Perceptual Status: Impaired  Initiation: Hand over hand to initiate tasks     Sensation  Overall Sensation Status: WFL        LUE AROM (degrees)  LUE AROM : WFL  RUE AROM (degrees)  RUE AROM : WFL  RUE General AROM: grossly wfl, limited cognition                      Plan   Plan  Times per week: 3-5  Times per day: Daily  Current Treatment Recommendations: Balance Training, Stair training, Patient/Caregiver Education & Training, Self-Care / ADL, Functional Mobility Training, Equipment Evaluation, Education, & procurement, Cognitive Reorientation, Safety Education & Training    G-Code     OutComes Score                                                  AM-PAC Score        AM-Located within [facility] Inpatient Daily Activity Raw Score: 6 (06/13/19 1009)  AM-PAC Inpatient ADL T-Scale Score : 17.07 (06/13/19 1009)  ADL Inpatient CMS 0-100% Score: 100 (06/13/19 1009)  ADL Inpatient CMS G-Code Modifier : CN (06/13/19 1009)    Goals  Short term goals  Short term goal 1: mod assist functional transfers  Short term goal 2: mod assist self feeding  Short term goal 3: mod assist bathing  Short term goal 4: mod assist dressing  Patient Goals   Patient goals : patient unable to state, sons goal is for patient to go home       Therapy Time   Individual Concurrent Group Co-treatment   Time In 0935         Time Out 0958         Minutes 23         Timed Code Treatment Minutes: [facility] Lorne Walker OT

## 2019-06-13 NOTE — DISCHARGE SUMMARY
normal S1, S2. No murmur. Respiratory. Not using accessory muscles. Decreased in bases,  Loose non productive cough   Gastrointestinal. Abdomen soft, non-tender, not distended, normal bowel sounds  Neurology. Facial symmetry. Moving all extremities equally, power 3/5   Extremities. No edema in lower extremities. Skin. Warm, dry, normal turgor    Condition at time of discharge stable     Medication instructions provided to patient at discharge. Medication List      START taking these medications    haloperidol 5 MG tablet  Commonly known as:  HALDOL  Take 0.5 tablets by mouth every 6 hours as needed for Agitation     levofloxacin 500 MG tablet  Commonly known as:  LEVAQUIN  Take 1 tablet by mouth daily for 6 days  Start taking on:  6/14/2019  Notes to patient:  Levofloxacin (Levaquin)  Use: treats infections  Side effects: headache, nausea, diarrhea         CHANGE how you take these medications    cetirizine 10 MG tablet  Commonly known as:  ZYRTEC  What changed:  Another medication with the same name was removed. Continue taking this medication, and follow the directions you see here.         CONTINUE taking these medications    ipratropium-albuterol 0.5-2.5 (3) MG/3ML Soln nebulizer solution  Commonly known as:  DUONEB  Inhale 3 mLs into the lungs every 4 hours as needed for Shortness of Breath     levothyroxine 100 MCG tablet  Commonly known as:  SYNTHROID     melatonin 3 MG Tabs tablet     mirtazapine 15 MG tablet  Commonly known as:  REMERON        STOP taking these medications    aspirin 81 MG EC tablet     atropine 1 % ophthalmic solution     carboxymethylcellulose 1 % ophthalmic solution     guaiFENesin 600 MG extended release tablet  Commonly known as:  MUCINEX     QUEtiapine 50 MG extended release tablet  Commonly known as:  SEROQUEL XR     simvastatin 80 MG tablet  Commonly known as:  ZOCOR           Where to Get Your Medications      You can get these medications from any pharmacy    Bring a paper prescription for each of these medications  · haloperidol 5 MG tablet  · levofloxacin 500 MG tablet         Discharge recommendations given to patient. Follow Up. With hospice care   Disposition. Home   Activity. as tolerated   Diet: DIET DYSPHAGIA I PUREED; Honey Thick; No Drinking Straw  Dietary Nutrition Supplements: Standard High Calorie Oral Supplement      Spent > 30  minutes in discharge process.     Signed:  IRINA Little CNP     6/13/2019 11:31 AM

## 2019-06-13 NOTE — PLAN OF CARE
Problem: Falls - Risk of:  Goal: Will remain free from falls  Description  Will remain free from falls  Outcome: Ongoing  Goal: Absence of physical injury  Description  Absence of physical injury  Outcome: Ongoing     Problem:  Activity:  Goal: Fatigue will decrease  Description  Fatigue will decrease  Outcome: Ongoing     Problem: Coping:  Goal: Ability to identify and utilize available resources and services will improve  Description  Ability to identify and utilize available resources and services will improve  Outcome: Ongoing

## 2019-06-13 NOTE — CARE COORDINATION
Pt will d/c to home today w/caregiver. Aware that pt's family would like referral sent to [facility] for them to meet in the home. Faxed info to [facility] and spoke w/admissions to make aware of incoming referral.  D/C plan: home w/family-HOC to f/u w/pt and family once home. Electronically signed by MERE Butler on 6/13/2019 at 11:44 AM    ADDENDUM:  Aware that pt will need stretcher transport to return home.   Arranged transport for 230pm.  Electronically signed by MERE Butler on 6/13/2019 at 12:07 PM

## 2019-06-13 NOTE — PROGRESS NOTES
CLINICAL PHARMACY NOTE: MEDS TO [address] Patient?: No  Total # of Prescriptions Filled: 2    The following medications were delivered to the patient:  · Haloperidol  · Levofloxacin  Total # of Interventions Completed: 1  Time Spent (min): 75    Additional Documentation:  Delivered to nurses station, only 5 tablets of haloperidol in stock.  Consulted MD on haloperidol and levaquin d/d interaction  Jesús

## 2019-06-13 NOTE — PROGRESS NOTES
Physical Therapy    Facility/Department: [address] ONCOLOGY  Initial Assessment/Discharge Summary   NAME: Janette Ellison  : 3/6/1923  MRN: [id number]    Date of Service: 2019    Discharge Recommendations: Janette Ellison scored a  on the AM-PAC short mobility form. Current research shows that an AM-PAC score of 18 or greater is typically associated with a discharge to the patient's home setting. Based on the patients AM-PAC score and their current functional mobility deficits, it is recommended that the patient have 2-3 sessions per week of Physical Therapy at d/c to increase the patients independence. Although the patient's AM-PAC score is below the baseline score to return home, due to the 24 hr assistance provided by family and services through the [place] the patient is able to return home to prior services. Patient will continually be assessed for appropriateness of home therapy. S3 level. Addendum:  Notified by NP that patient is now being discharged home with Hospice. Will discharge from PT caseload. PT Equipment Recommendations  Equipment Needed: No    Assessment   Body structures, Functions, Activity limitations: Decreased cognition;Decreased functional mobility ; Decreased endurance;Decreased strength;Decreased balance;Decreased safe awareness; Increased Pain  Assessment: pt. presents with above deficits below baseline function and will benefit from skilled PT to improve overall function and safely return to OF   Treatment Diagnosis: impaired strength, endurance, and cognition   Prognosis: Fair  Decision Making: Medium Complexity  History: advanced dementia, A-fib, 24hr care required   Exam: functional mobility, balance, AMPAC   Clinical Presentation: evolving  Patient Education: reason for PT eval, discharge plan  Barriers to Learning: cognitive   REQUIRES PT FOLLOW UP: Yes  Activity Tolerance  Activity Tolerance: Patient limited by cognitive status; Patient limited by fatigue;Patient limited by endurance       Patient Diagnosis(es): The primary encounter diagnosis was Pneumonia due to organism. Diagnoses of Hyponatremia and Adult failure to thrive were also pertinent to this visit. has a past medical history of A-fib ([address] Cancer ([facility] [address] Hyperlipemia, Hypertension, and Thyroid disease. has a past surgical history that includes colectomy and pacemaker placement. Restrictions  Restrictions/Precautions  Restrictions/Precautions: Fall Risk(high fall risk)  Position Activity Restriction  Other position/activity restrictions: 80 y.o. male who presents to the emergency department complaining of cough, congestion and shortness of breath. Patient is not a good historian, secondary to his dementia. Daughter has been trying Mucinex without any relief. He does have intermittent chills but denies documented fever. Denies chest pain, abdominal pain, nausea, vomiting, pain or swelling in extremities. Son has noticed some weight loss and poor appetite for the past few weeks  Vision/Hearing  Vision: Impaired  Vision Exceptions: Wears glasses at all times  Hearing: Exceptions to [facility]  Hearing Exceptions: Hard of hearing/hearing concerns     Subjective  General  Chart Reviewed:  Yes  Additional Pertinent Hx: advanced dementia, a-fib, colon, cancer, HTN, thyroid disease   Response To Previous Treatment: Not applicable  Family / Caregiver Present: Yes(son)  Diagnosis: impaired strength, endurance, and cognition   Follows Commands: Impaired(disoriented, non verbal)  General Comment  Comments: pt. was supine in bed upon arrival  Subjective  Subjective: pt. was agreeable to PT on this date   Pain Screening  Patient Currently in Pain: Denies  Vital Signs  Patient Currently in Pain: Denies  Pre Treatment Pain Screening  Comments / Details: pt. anxious upon getting out of bed     Orientation  Orientation  Overall Orientation Status: Impaired(pt. non verbal, understands minimal through cuing )  Social/Functional History  Social/Functional History  Lives With: Daughter  Type of Home: House  Home Layout: One level  Home Access: Stairs to enter with rails  Entrance Stairs - Number of Steps: 1  Bathroom Shower/Tub: Walk-in shower  Bathroom Equipment: Grab bars in shower, Shower chair  Bathroom Accessibility: Accessible  Home Equipment: Hospital bed  Receives Help From: Family  ADL Assistance: Needs assistance  Homemaking Assistance: Needs assistance  Homemaking Responsibilities: No  Ambulation Assistance: Independent(uses walker )  Transfer Assistance: Independent  Active : No  Patient's  Info: daughter in law   Additional Comments: a few falls in last 6 months, history obtained from son     Objective     Observation/Palpation  Posture: Poor  Observation: significant kyphosis    AROM RLE (degrees)  RLE AROM: WFL  AROM LLE (degrees)  LLE AROM : WFL  Strength RLE  Comment: strength not tested due to pt. impaired cognition  Strength LLE  Comment: strength not tested due to pt. impaired cognition      Sensation  Overall Sensation Status: WFL  Bed mobility  Supine to Sit: Dependent/Total(max Ax2 )  Scooting: Dependent/Total(max Ax2 )  Comment: HOB raised, pt. anxious about sitting up in bed, did not initate any movement   Transfers  Lateral Transfers: Dependent/Total(max Ax2 for lateral slide transfer into chair )  Comment: pt. anxious with transfer to chair, did not initiate any movement   Ambulation  Ambulation?: No(unsafe to attempt at this time )  Stairs/Curb  Stairs?: No     Balance  Posture: Fair  Sitting - Static: Poor(required max A for sitting balance )  Sitting - Dynamic: Poor  Comments: standing balance not assessed at this time due to weakness and impaired cognition         Plan   Plan  Times per week: 3-5  Times per day: Daily  Current Treatment Recommendations: Strengthening, Gait Training, Balance Training, Cognitive Reorientation, Functional Mobility Training, Endurance Training,

## 2019-06-13 NOTE — DISCHARGE INSTR - COC
Continuity of Care Form    Patient Name: Zully Johnson   :  3/6/1923  MRN:  [id number]    Admit date:  2019  Discharge date:  2019    Code Status Order: [facility]   Advance Directives:   [facility] Documentation     Date/Time Healthcare Directive Type of Healthcare Directive Copy in [address] Agent's Name Healthcare Agent's Phone Number    19 1433  Yes, patient has an advance directive for healthcare treatment  --  --  --  --  --          Admitting Physician:  Gaurav Gandara MD  PCP: Fox Burnett [id number]    Discharging Nurse: [facility] Unit/Room#: 7YY-5173/7692-69  Discharging Unit Phone Number: [phone]    Emergency Contact:   Extended Emergency Contact Information  Primary Emergency Contact: Ford Arrow [address] Phone: [phone]  Mobile Phone: [phone]  Relation: Other  Secondary Emergency Contact: Quiana [address] Phone: [phone]  Relation: Child    Past Surgical History:  Past Surgical History:   Procedure Laterality Date    COLECTOMY      r/t ca    PACEMAKER PLACEMENT         Immunization History: There is no immunization history on file for this patient.     Active Problems:  Patient Active Problem List   Diagnosis Code    SBO (small bowel obstruction) ([facility]) K56.609    Acquired hypothyroidism E03.9    Essential hypertension I10    Acute respiratory failure with hypoxia ([facility]) J96.01    Aspiration pneumonitis ([facility]) J69.0    Hiatal hernia K44.9    Facial droop R29.810    Cancer ([address] C80.1    Social problem Z65.9    Pneumonia J18.9       Isolation/Infection:   Isolation          No Isolation            Nurse Assessment:  Last Vital Signs: BP (!) 147/83   Pulse 61   Temp 97.4 °F (36.3 °C) (Axillary)   Resp 18   Ht 6' (1.829 m)   Wt 159 lb 6.4 oz (72.3 kg)   SpO2 97%   BMI 21.62 kg/m²     Last documented pain score (0-10 scale): Pain Level: 0  Last Weight:   Wt Readings from Last 1 Encounters:   06/11/19 159 lb 6.4 oz (72.3 kg)     Mental Status:  disoriented and alert    IV Access:  - None    Nursing Mobility/ADLs:  Walking   Assisted  Transfer  Assisted  Bathing  Assisted  Dressing  Assisted  Toileting  Assisted  Feeding  Assisted  Med Admin  Assisted  Med Delivery   crushed    Wound Care Documentation and Therapy:  Wound 09/10/18 Other (Comment) Coccyx Mid (Active)   Number of days: 276        Elimination:  Continence:   · Bowel: No  · Bladder: Yes  Urinary Catheter: None   Colostomy/Ileostomy/Ileal Conduit: No       Date of Last BM:     Intake/Output Summary (Last 24 hours) at 6/13/2019 1131  Last data filed at 6/13/2019 0647  Gross per 24 hour   Intake 1740 ml   Output 275 ml   Net 1465 ml     I/O last 3 completed shifts: In: 1740 [I.V.:1740]  Out: 550 [Urine:550]    Safety Concerns: At Risk for Falls and Aspiration Risk    Impairments/Disabilities:      Hearing    Nutrition Therapy:  Current Nutrition Therapy:   Pureed diet    Routes of Feeding: Oral  Liquids: Honey Thick Liquids  Daily Fluid Restriction: no  Last Modified Barium Swallow with Video (Video Swallowing Test): not done    Treatments at the Time of Hospital Discharge:   Respiratory Treatments:   Oxygen Therapy:  is not on home oxygen therapy. Ventilator:    - No ventilator support    Rehab Therapies:   Weight Bearing Status/Restrictions: No weight bearing restirctions  Other Medical Equipment (for information only, NOT a DME order):     Other Treatments:     Patient's personal belongings (please select all that are sent with patient):  Hearing Aides bilateral    RN SIGNATURE:  Electronically signed by Peter Thomas RN on 6/13/19 at 11:49 AM    CASE MANAGEMENT/SOCIAL WORK SECTION    Inpatient Status Date: ***    Readmission Risk Assessment Score:  Readmission Risk              Risk of Unplanned Readmission:        21           Discharging to Facility/ Agency   · Name:

## 2019-06-13 NOTE — PROGRESS NOTES
Shift assessment completed. IV removed per MD order. Scheduled medications given. Son at bedside. Educated son on no straws or ice for patient, verbalizes understanding. Honey thickened water provided. Denies additional needs.

## 2019-06-13 NOTE — PROGRESS NOTES
Discharge instructions reviewed with [facility]. Verbalizes understanding. Agreeable to meet with HOC at home. Waiting on transport for .

## 2019-06-13 NOTE — PROGRESS NOTES
Attempted to call patients POA kory, no success. Patients son at bedside and aware. Aware of 1430 transport time to home.

## 2019-06-14 LAB
CULTURE, BLOOD 2: ABNORMAL
ORGANISM: ABNORMAL
ORGANISM: ABNORMAL

## 2019-06-14 NOTE — PROGRESS NOTES
Speech/Language Pathology  Discharge Note    Name: Salvatore Alfredo   :3/6/1923     Speech Therapy/Dysphagia  Pt discharged to home on 19. Bedside Swallow Eval completed 19 (see report). No goals met prior to discharge. Recommend: Continued Dysphagia treatment at home, with goals and treatment per [facility].     DIET LEVEL AT DISCHARGE:  Dysphagia I (puree) with HONEY thick liquids, no straws, meds crushed in puree    DYSPHAGIA GOALS:  1.) Pt will tolerate recommended diet without s/s of aspiration (GOAL NOT MET)  2.) Pt will tolerate diet advance to least restricted diet, as clinically indicated, with no signs of aspiration (GOAL NOT MET)      Pamela Maynard M.S. [id number] [facility]  Speech-Language Pathologist

## 2019-06-16 LAB — BLOOD CULTURE, ROUTINE: NORMAL

## 2019-06-23 ENCOUNTER — HOSPITAL ENCOUNTER (OUTPATIENT)
Age: 84
Setting detail: OBSERVATION
Discharge: HOSPICE | End: 2019-06-28
Attending: EMERGENCY MEDICINE | Admitting: INTERNAL MEDICINE
Payer: COMMERCIAL

## 2019-06-23 ENCOUNTER — APPOINTMENT (OUTPATIENT)
Dept: CT IMAGING | Age: 84
End: 2019-06-23
Payer: COMMERCIAL

## 2019-06-23 ENCOUNTER — APPOINTMENT (OUTPATIENT)
Dept: GENERAL RADIOLOGY | Age: 84
End: 2019-06-23
Payer: COMMERCIAL

## 2019-06-23 DIAGNOSIS — R77.8 ELEVATED TROPONIN: ICD-10-CM

## 2019-06-23 DIAGNOSIS — J18.9 PNEUMONIA DUE TO ORGANISM: Primary | ICD-10-CM

## 2019-06-23 DIAGNOSIS — F02.80 ALZHEIMER'S DEMENTIA WITHOUT BEHAVIORAL DISTURBANCE, UNSPECIFIED TIMING OF DEMENTIA ONSET: ICD-10-CM

## 2019-06-23 DIAGNOSIS — G30.9 ALZHEIMER'S DEMENTIA WITHOUT BEHAVIORAL DISTURBANCE, UNSPECIFIED TIMING OF DEMENTIA ONSET: ICD-10-CM

## 2019-06-23 PROBLEM — F03.C0 ADVANCED DEMENTIA: Status: ACTIVE | Noted: 2019-06-23

## 2019-06-23 LAB
A/G RATIO: 0.9 (ref 1.1–2.2)
ALBUMIN SERPL-MCNC: 3.3 G/DL (ref 3.4–5)
ALP BLD-CCNC: 89 U/L (ref 40–129)
ALT SERPL-CCNC: 13 U/L (ref 10–40)
ANION GAP SERPL CALCULATED.3IONS-SCNC: 12 MMOL/L (ref 3–16)
AST SERPL-CCNC: 21 U/L (ref 15–37)
BASOPHILS ABSOLUTE: 0 K/UL (ref 0–0.2)
BASOPHILS RELATIVE PERCENT: 0.6 %
BILIRUB SERPL-MCNC: 1.5 MG/DL (ref 0–1)
BUN BLDV-MCNC: 14 MG/DL (ref 7–20)
CALCIUM SERPL-MCNC: 8.6 MG/DL (ref 8.3–10.6)
CHLORIDE BLD-SCNC: 95 MMOL/L (ref 99–110)
CO2: 26 MMOL/L (ref 21–32)
CREAT SERPL-MCNC: 0.8 MG/DL (ref 0.8–1.3)
EOSINOPHILS ABSOLUTE: 0.1 K/UL (ref 0–0.6)
EOSINOPHILS RELATIVE PERCENT: 1.1 %
GFR AFRICAN AMERICAN: >60
GFR NON-AFRICAN AMERICAN: >60
GLOBULIN: 3.5 G/DL
GLUCOSE BLD-MCNC: 103 MG/DL (ref 70–99)
HCT VFR BLD CALC: 40 % (ref 40.5–52.5)
HEMOGLOBIN: 13.4 G/DL (ref 13.5–17.5)
LACTIC ACID: 0.7 MMOL/L (ref 0.4–2)
LYMPHOCYTES ABSOLUTE: 1 K/UL (ref 1–5.1)
LYMPHOCYTES RELATIVE PERCENT: 13.2 %
MCH RBC QN AUTO: 31.3 PG (ref 26–34)
MCHC RBC AUTO-ENTMCNC: 33.5 G/DL (ref 31–36)
MCV RBC AUTO: 93.3 FL (ref 80–100)
MONOCYTES ABSOLUTE: 0.7 K/UL (ref 0–1.3)
MONOCYTES RELATIVE PERCENT: 8.7 %
NEUTROPHILS ABSOLUTE: 6 K/UL (ref 1.7–7.7)
NEUTROPHILS RELATIVE PERCENT: 76.4 %
PDW BLD-RTO: 14 % (ref 12.4–15.4)
PLATELET # BLD: 193 K/UL (ref 135–450)
PMV BLD AUTO: 7.2 FL (ref 5–10.5)
POTASSIUM REFLEX MAGNESIUM: 4.3 MMOL/L (ref 3.5–5.1)
PRO-BNP: 1298 PG/ML (ref 0–449)
RBC # BLD: 4.28 M/UL (ref 4.2–5.9)
SODIUM BLD-SCNC: 133 MMOL/L (ref 136–145)
TOTAL PROTEIN: 6.8 G/DL (ref 6.4–8.2)
TROPONIN: 0.04 NG/ML
WBC # BLD: 7.9 K/UL (ref 4–11)

## 2019-06-23 PROCEDURE — 93005 ELECTROCARDIOGRAM TRACING: CPT | Performed by: PHYSICIAN ASSISTANT

## 2019-06-23 PROCEDURE — 87040 BLOOD CULTURE FOR BACTERIA: CPT

## 2019-06-23 PROCEDURE — 6370000000 HC RX 637 (ALT 250 FOR IP): Performed by: INTERNAL MEDICINE

## 2019-06-23 PROCEDURE — 71250 CT THORAX DX C-: CPT

## 2019-06-23 PROCEDURE — 6360000002 HC RX W HCPCS: Performed by: INTERNAL MEDICINE

## 2019-06-23 PROCEDURE — 94761 N-INVAS EAR/PLS OXIMETRY MLT: CPT

## 2019-06-23 PROCEDURE — 94640 AIRWAY INHALATION TREATMENT: CPT

## 2019-06-23 PROCEDURE — 6360000002 HC RX W HCPCS: Performed by: EMERGENCY MEDICINE

## 2019-06-23 PROCEDURE — 80053 COMPREHEN METABOLIC PANEL: CPT

## 2019-06-23 PROCEDURE — 99285 EMERGENCY DEPT VISIT HI MDM: CPT

## 2019-06-23 PROCEDURE — 96367 TX/PROPH/DG ADDL SEQ IV INF: CPT

## 2019-06-23 PROCEDURE — 85025 COMPLETE CBC W/AUTO DIFF WBC: CPT

## 2019-06-23 PROCEDURE — 2580000003 HC RX 258: Performed by: INTERNAL MEDICINE

## 2019-06-23 PROCEDURE — 71045 X-RAY EXAM CHEST 1 VIEW: CPT

## 2019-06-23 PROCEDURE — 2580000003 HC RX 258: Performed by: EMERGENCY MEDICINE

## 2019-06-23 PROCEDURE — 83880 ASSAY OF NATRIURETIC PEPTIDE: CPT

## 2019-06-23 PROCEDURE — 96372 THER/PROPH/DIAG INJ SC/IM: CPT

## 2019-06-23 PROCEDURE — 36415 COLL VENOUS BLD VENIPUNCTURE: CPT

## 2019-06-23 PROCEDURE — 96375 TX/PRO/DX INJ NEW DRUG ADDON: CPT

## 2019-06-23 PROCEDURE — G0378 HOSPITAL OBSERVATION PER HR: HCPCS

## 2019-06-23 PROCEDURE — 6370000000 HC RX 637 (ALT 250 FOR IP): Performed by: PHYSICIAN ASSISTANT

## 2019-06-23 PROCEDURE — 84484 ASSAY OF TROPONIN QUANT: CPT

## 2019-06-23 PROCEDURE — 96365 THER/PROPH/DIAG IV INF INIT: CPT

## 2019-06-23 PROCEDURE — 83605 ASSAY OF LACTIC ACID: CPT

## 2019-06-23 RX ORDER — SODIUM CHLORIDE 0.9 % (FLUSH) 0.9 %
10 SYRINGE (ML) INJECTION PRN
Status: DISCONTINUED | OUTPATIENT
Start: 2019-06-23 | End: 2019-06-28 | Stop reason: HOSPADM

## 2019-06-23 RX ORDER — CETIRIZINE HYDROCHLORIDE 10 MG/1
10 TABLET ORAL DAILY
Status: DISCONTINUED | OUTPATIENT
Start: 2019-06-24 | End: 2019-06-24

## 2019-06-23 RX ORDER — SODIUM CHLORIDE 0.9 % (FLUSH) 0.9 %
10 SYRINGE (ML) INJECTION EVERY 12 HOURS SCHEDULED
Status: DISCONTINUED | OUTPATIENT
Start: 2019-06-23 | End: 2019-06-28 | Stop reason: HOSPADM

## 2019-06-23 RX ORDER — IPRATROPIUM BROMIDE AND ALBUTEROL SULFATE 2.5; .5 MG/3ML; MG/3ML
3 SOLUTION RESPIRATORY (INHALATION) EVERY 4 HOURS PRN
Status: DISCONTINUED | OUTPATIENT
Start: 2019-06-23 | End: 2019-06-28 | Stop reason: HOSPADM

## 2019-06-23 RX ORDER — MIRTAZAPINE 15 MG/1
15 TABLET, FILM COATED ORAL NIGHTLY
Status: DISCONTINUED | OUTPATIENT
Start: 2019-06-23 | End: 2019-06-28 | Stop reason: HOSPADM

## 2019-06-23 RX ORDER — ACETAMINOPHEN 80 MG
TABLET,CHEWABLE ORAL
Status: COMPLETED
Start: 2019-06-23 | End: 2019-06-24

## 2019-06-23 RX ORDER — HALOPERIDOL 5 MG
2.5 TABLET ORAL EVERY 6 HOURS PRN
Status: DISCONTINUED | OUTPATIENT
Start: 2019-06-23 | End: 2019-06-28 | Stop reason: HOSPADM

## 2019-06-23 RX ORDER — ASPIRIN 81 MG/1
324 TABLET, CHEWABLE ORAL ONCE
Status: COMPLETED | OUTPATIENT
Start: 2019-06-23 | End: 2019-06-23

## 2019-06-23 RX ORDER — 0.9 % SODIUM CHLORIDE 0.9 %
500 INTRAVENOUS SOLUTION INTRAVENOUS ONCE
Status: DISCONTINUED | OUTPATIENT
Start: 2019-06-23 | End: 2019-06-23

## 2019-06-23 RX ORDER — LANOLIN ALCOHOL/MO/W.PET/CERES
3 CREAM (GRAM) TOPICAL NIGHTLY PRN
Status: DISCONTINUED | OUTPATIENT
Start: 2019-06-23 | End: 2019-06-28 | Stop reason: HOSPADM

## 2019-06-23 RX ORDER — ONDANSETRON 2 MG/ML
4 INJECTION INTRAMUSCULAR; INTRAVENOUS EVERY 6 HOURS PRN
Status: DISCONTINUED | OUTPATIENT
Start: 2019-06-23 | End: 2019-06-28 | Stop reason: HOSPADM

## 2019-06-23 RX ORDER — VANCOMYCIN HYDROCHLORIDE 1 G/200ML
1000 INJECTION, SOLUTION INTRAVENOUS ONCE
Status: COMPLETED | OUTPATIENT
Start: 2019-06-23 | End: 2019-06-23

## 2019-06-23 RX ORDER — DIPHENHYDRAMINE HYDROCHLORIDE 50 MG/ML
25 INJECTION INTRAMUSCULAR; INTRAVENOUS ONCE
Status: COMPLETED | OUTPATIENT
Start: 2019-06-23 | End: 2019-06-23

## 2019-06-23 RX ORDER — HALOPERIDOL 5 MG/ML
2.5 INJECTION INTRAMUSCULAR ONCE
Status: COMPLETED | OUTPATIENT
Start: 2019-06-23 | End: 2019-06-23

## 2019-06-23 RX ORDER — LEVOTHYROXINE SODIUM 0.1 MG/1
100 TABLET ORAL DAILY
Status: DISCONTINUED | OUTPATIENT
Start: 2019-06-24 | End: 2019-06-28 | Stop reason: HOSPADM

## 2019-06-23 RX ADMIN — HALOPERIDOL 2.5 MG: 5 TABLET ORAL at 18:20

## 2019-06-23 RX ADMIN — Medication 10 ML: at 20:49

## 2019-06-23 RX ADMIN — CEFEPIME HYDROCHLORIDE 2 G: 2 INJECTION, POWDER, FOR SOLUTION INTRAVENOUS at 13:00

## 2019-06-23 RX ADMIN — ENOXAPARIN SODIUM 40 MG: 40 INJECTION SUBCUTANEOUS at 20:49

## 2019-06-23 RX ADMIN — DIPHENHYDRAMINE HYDROCHLORIDE 25 MG: 50 INJECTION, SOLUTION INTRAMUSCULAR; INTRAVENOUS at 12:55

## 2019-06-23 RX ADMIN — HALOPERIDOL LACTATE 2.5 MG: 5 INJECTION INTRAMUSCULAR at 12:55

## 2019-06-23 RX ADMIN — MIRTAZAPINE 15 MG: 15 TABLET, FILM COATED ORAL at 20:48

## 2019-06-23 RX ADMIN — HALOPERIDOL 2.5 MG: 5 TABLET ORAL at 22:47

## 2019-06-23 RX ADMIN — IPRATROPIUM BROMIDE AND ALBUTEROL SULFATE 3 ML: .5; 3 SOLUTION RESPIRATORY (INHALATION) at 18:21

## 2019-06-23 RX ADMIN — ASPIRIN 81 MG 324 MG: 81 TABLET ORAL at 12:26

## 2019-06-23 RX ADMIN — VANCOMYCIN HYDROCHLORIDE 1000 MG: 1 INJECTION, SOLUTION INTRAVENOUS at 15:18

## 2019-06-23 ASSESSMENT — PAIN SCALES - GENERAL
PAINLEVEL_OUTOF10: 0
PAINLEVEL_OUTOF10: 0
PAINLEVEL_OUTOF10: 6

## 2019-06-23 NOTE — PROGRESS NOTES
4 Eyes Skin Assessment     The patient is being assess for  Admission    I agree that 2 RN's have performed a thorough Head to Toe Skin Assessment on the patient. ALL assessment sites listed below have been assessed. Areas assessed by both nurses:   [x]   Head, Face, and Ears   [x]   Shoulders, Back, and Chest  [x]   Arms, Elbows, and Hands   [x]   Coccyx, Sacrum, and IschIum  [x]   Legs, Feet, and Heels     Red coccyx, mepelix in place. Right heel purple. Dry flaky skin. Does the Patient have Skin Breakdown?   Yes LDA WOUND CARE was Initiated documentation include the Josette-wound, Wound Assessment, Measurements, Dressing Treatment, Drainage, and Color\",         Moe Prevention initiated:  Yes   Wound Care Orders initiated:  Yes      [address]  nurse consulted for Pressure Injury (Stage 3,4, Unstageable, DTI, NWPT, and Complex wounds), New and Established Ostomies:  No      Nurse 1 eSignature: Electronically signed by Rey Crockett RN on 6/23/19 at 6:28 PM    **SHARE this note so that the co-signing nurse is able to place an eSignature**    Nurse 2 eSignature: {Esignature:[id number]}

## 2019-06-23 NOTE — CARE COORDINATION
6/23/2019-   Received call from ED for Hospice consult. Met with son Tobi Martinez, (WANDA) Chas Rosenberg stated were interested in hospice. Referral faxed to [address] family had met with them on two previous occassions but each time decided was not ready and now they feel they are. Await call from Haylee Perez with [address] who will come see patient in ED bed 19. Continue to follow. ADDENDUM:  Haylee Perez with [address] to meet with family in ED and assess patient. Informed her POA in room 5516 if consents need signed. HOC will contact CM if assistance needed.

## 2019-06-23 NOTE — ED PROVIDER NOTES
[address]  eMERGENCY dEPARTMENT eNCOUnter      Pt Name: Kerwin Sneed  MRN: [id number]  [place] 3/6/1923  Date of evaluation: 6/23/2019  Provider: FAMILIA Bermudez  PCP: Fox Burnett 5519  ED Attending: Dr. Rah Izquierdo       Chief Complaint   Patient presents with    Shortness of Breath     EMS reporting SOB with some thick sputum coughing up, EMS reporting that son reported pneumonia recent dx       HISTORY OF PRESENT ILLNESS   (Location/Symptom, Timing/Onset, Context/Setting, Quality, Duration, Modifying Factors, Severity)  Note limiting factors. Kerwin Sneed is a 80 y.o. male who presents to the emergency department brought in by EMS with reports of SOB. Patient apparently recently diagnosed with pneumonia and generalized weakness. Unknown if patient is currently on antibiotics. Patient is pleasantly demented. Patient unable to provide any history, no family here presently. Apparently patient's normal caregiver was recently admitted to the hospital just yesterday. Patient is a DNR CC. At time of discharge on the 13th they discussed outpatient follow-up with hospice [facility]. Apparently this was never done. Family would still like hospice consult. Patient unable to provide any information or participate in current HPI. Nursing Notes were all reviewed and agreed with or any disagreements were addressed  in the HPI. REVIEW OF SYSTEMS    (2-9 systems for level 4, 10 or more for level 5)     Review of Systems   Unable to perform ROS: Dementia       Positives and pertinent negatives as per HPI. All other systems were reviewed and are negative. PHYSICAL EXAM    (up to 7 for level 4, 8 or more for level 5)     ED Triage Vitals [06/23/19 1118]   BP Temp Temp Source Pulse Resp SpO2 Height Weight   130/78 97.7 °F (36.5 °C) Infrared 63 20 95 % -- --       Physical Exam   Constitutional: He appears well-developed and well-nourished.  He is active and cooperative. Non-toxic appearance. HENT:   Head: Normocephalic. Right Ear: External ear normal.   Left Ear: External ear normal.   Nose: Nose normal.   Eyes: Conjunctivae are normal. Right eye exhibits no discharge. Left eye exhibits no discharge. Neck: Normal range of motion. Neck supple. Cardiovascular: Normal rate, regular rhythm and normal heart sounds. Exam reveals no gallop and no friction rub. No murmur heard. Pulmonary/Chest: Effort normal and breath sounds normal. No respiratory distress. He has no wheezes. He has no rales. Musculoskeletal: Normal range of motion. Neurological: He is alert. Skin: Skin is warm and dry. He is not diaphoretic. No pallor. Psychiatric: He has a normal mood and affect. His behavior is normal.   Nursing note and vitals reviewed. PAST MEDICAL HISTORY     Past Medical History:   Diagnosis Date    [facility] [address] [facility]     Cancer ([facility]     colon    Hyperlipemia     Hypertension     Thyroid disease        SURGICAL HISTORY       Past Surgical History:   Procedure Laterality Date    COLECTOMY      r/t ca    PACEMAKER PLACEMENT         CURRENT MEDICATIONS       Current Discharge Medication List      CONTINUE these medications which have NOT CHANGED    Details   haloperidol (HALDOL) 5 MG tablet Take 0.5 tablets by mouth every 6 hours as needed for Agitation  Qty: 30 tablet, Refills: 0      melatonin 3 MG TABS tablet Take 3 mg by mouth nightly as needed      cetirizine (ZYRTEC) 10 MG tablet Take 10 mg by mouth daily      ipratropium-albuterol (DUONEB) 0.5-2.5 (3) MG/3ML SOLN nebulizer solution Inhale 3 mLs into the lungs every 4 hours as needed for Shortness of Breath  Qty: 360 mL, Refills: 1      levothyroxine (SYNTHROID) 100 MCG tablet Take 100 mcg by mouth Daily      mirtazapine (REMERON) 15 MG tablet Take 7.5 mg by mouth nightly              ALLERGIES     Patient has no known allergies. FAMILY HISTORY     History reviewed. No pertinent family history. SOCIAL HISTORY       Social History     Socioeconomic History    Marital status:       Spouse name: None    Number of children: None    Years of education: None    Highest education level: None   Occupational History    None   Social Needs    Financial resource strain: None    Food insecurity:     Worry: None     Inability: None    Transportation needs:     Medical: None     Non-medical: None   Tobacco Use    Smoking status: Never Smoker    Smokeless tobacco: Never Used   Substance and Sexual Activity    Alcohol use: Yes     Comment: occasional    Drug use: No    Sexual activity: Not Currently   Lifestyle    Physical activity:     Days per week: None     Minutes per session: None    Stress: None   Relationships    Social connections:     Talks on phone: None     Gets together: None     Attends [employer] service: None     Active member of club or organization: None     Attends meetings of clubs or organizations: None     Relationship status: None    Intimate partner violence:     Fear of current or ex partner: None     Emotionally abused: None     Physically abused: None     Forced sexual activity: None   Other Topics Concern    None   Social History Narrative    None       SCREENINGS           DIAGNOSTIC RESULTS   LABS:    Labs Reviewed   CBC WITH AUTO DIFFERENTIAL - Abnormal; Notable for the following components:       Result Value    Hemoglobin 13.4 (*)     Hematocrit 40.0 (*)     All other components within normal limits    Narrative:     Performed at:  [facility] [address], [place], [facility] [address]   Phone [phone]   COMPREHENSIVE METABOLIC PANEL W/ REFLEX TO MG FOR LOW K - Abnormal; Notable for the following components:    Sodium 133 (*)     Chloride 95 (*)     Glucose 103 (*)     Alb 3.3 (*)     Albumin/Globulin Ratio 0.9 (*)     Total Bilirubin 1.5 (*)     All other components within normal limits    Narrative:     Performed at:  [id number] [facility]  [address]  [place], [address]   Phone [phone]   TROPONIN - Abnormal; Notable for the following components:    Troponin 0.04 (*)     All other components within normal limits    Narrative:     Performed at:  [facility]  [address]  [place], [address]   Phone 21  - Abnormal; Notable for the following components:    Pro-BNP 1,298 (*)     All other components within normal limits    Narrative:     Performed at:  [facility]  [address]  [place], [address]   Phone [phone]   CULTURE BLOOD #2   CULTURE BLOOD #1   RESPIRATORY PANEL, FILM ARRAY   LACTIC ACID, PLASMA    Narrative:     Performed at:  [facility]  [address]  [place], [address]   Phone [phone]   URINE RT REFLEX TO CULTURE       All other labs were within normal range or not returned as of this dictation. EKG: All EKG's areinterpreted by the Emergency Department Physician who either signs or Co-signs this chart in the absence of a cardiologist.    RADIOLOGY:   Non-plain film images such as CT, Ultrasound and MRI are read by the radiologist. Horris Hamper radiographicimages are visualized and preliminarily interpreted by the  ED Provider with the below findings:    Interpretation per the Radiologist below, if available at the time of this note:    CT CHEST [address]   Final Result   1. No acute abnormalities seen in the chest   2. Changes throughout both lungs suggesting UIP and idiopathic pulmonary   fibrosis         XR CHEST PORTABLE   Preliminary Result   Obscured left hemidiaphragm, which could be related to left basilar   infiltrate, atelectasis, or small pleural effusion. No other acute findings   in the chest.  Background COPD.            Xr Chest Portable    Result Date: 6/11/2019  EXAMINATION: ONE XRAY VIEW OF THE CHEST 6/11/2019 5:34 pm COMPARISON: Portable chest 12/15/2018. HISTORY: ORDERING SYSTEM PROVIDED HISTORY: cough fever TECHNOLOGIST PROVIDED HISTORY: Reason for exam:->cough fever Acuity: Unknown Type of Exam: Unknown FINDINGS: Heart size and mediastinal contours not substantially changed. Cardiac loop monitoring device is noted. A hiatal hernia is suspected. Patchy right perihilar opacity. Mild bibasilar opacity is also present; left basilar aeration overall improved from December 2018. Suspect right perihilar airspace disease.        PROCEDURES   Unless otherwisenoted below, none     Procedures    CRITICAL CARE TIME   N/A    CONSULTS:  IP CONSULT TO HOSPICE  IP CONSULT TO HOSPITALIST  IP CONSULT TO PALLIATIVE CARE  IP CONSULT TO HOSPICE  IP CONSULT TO SOCIAL WORK    EMERGENCY DEPARTMENT COURSE andDIFFERENTIAL DIAGNOSIS/MDM:   Vitals:    Vitals:    06/23/19 1357 06/23/19 1415 06/23/19 1530 06/23/19 1645   BP:       Pulse: 78 91 64 66   Resp: 17 24 16 18   Temp:       TempSrc:       SpO2: 97% 96% 95% 96%       Patient wasgiven the following medications:  Medications   cetirizine (ZYRTEC) tablet 10 mg (has no administration in time range)   haloperidol (HALDOL) tablet 2.5 mg (has no administration in time range)   ipratropium-albuterol (DUONEB) nebulizer solution 3 mL (has no administration in time range)   levothyroxine (SYNTHROID) tablet 100 mcg (has no administration in time range)   melatonin tablet 3 mg (has no administration in time range)   mirtazapine (REMERON) tablet 7.5 mg (has no administration in time range)   sodium chloride flush 0.9 % injection 10 mL (has no administration in time range)   sodium chloride flush 0.9 % injection 10 mL (has no administration in time range)   magnesium hydroxide (MILK OF MAGNESIA) 400 MG/5ML suspension 30 mL (has no administration in time range)   ondansetron (ZOFRAN) injection 4 mg (has no administration in time range)   enoxaparin (LOVENOX) injection 40 mg (has no administration in time range)   aspirin chewable tablet 324 mg (324 mg Oral Given 6/23/19 1226)   haloperidol lactate (HALDOL) injection 2.5 mg (2.5 mg Intramuscular Given 6/23/19 1255)   diphenhydrAMINE (BENADRYL) injection 25 mg (25 mg Intravenous Given 6/23/19 1255)   vancomycin (VANCOCIN) 1000 mg in dextrose 5% 200 mL IVPB (0 mg Intravenous Stopped 6/23/19 1623)   cefepime (MAXIPIME) 2 g IVPB minibag (0 g Intravenous Stopped 6/23/19 1359)     Geeta Monroy is a 80 y.o. male who presents to the emergency department brought in by EMS with reports of SOB. Patient apparently recently diagnosed with pneumonia and generalized weakness. Unknown if patient is currently on antibiotics. Patient is pleasantly demented. Patient unable to provide any history, no family here presently. Apparently patient's normal caregiver was recently admitted to the hospital just yesterday. Patient is a DNR CC. At time of discharge on the 13th they discussed outpatient follow-up with [facility] [place]. Apparently this was never done. Family would still like hospice consult. Patient unable to provide any information or participate in current HPI. On exam patient is pleasantly demented, afebrile nontoxic in appearance. Chronically ill in appearance though. Normal white count of 7.9, blood cultures will be obtained. Normal renal function. Lactic acid is normal.Troponin is elevated to 0.04. He is not complaining of any chest pain. Chest x-ray initially thought maybe there was some evidence of pneumonia however on CT scan without contrast no acute abnormality seen in the chest.  Initially patient had been started on IV vancomycin and cefepime here in the ER and given IV fluids and aspirin for the elevated troponin. Patient is a DNR CC.      we discussed with inpatient  who is agreed to consult with hospice, apparently when hospice was consulted family did decline hospice.   Therefore patient will be brought in under hospitalist

## 2019-06-23 NOTE — ED NOTES
Charge nurse made aware of the need for  at this time. Pt is agitated and crawling out of bed despite rails being up and bed alarm in place. Pt continues to try to get up even when son is in the room. Stefan Corley in room at bedside for patient safety. Hospice nurse at bedside at this time.       Cullen Archer RN  06/23/19 8096

## 2019-06-23 NOTE — ED PROVIDER NOTES
I personally evaluated and examined the patient in conjunction with the APC and agree with the assessment, treatment plan and disposition of the patient has recorded by the APC. I reviewed pertinent nurse's notes, triage notes, vital signs, past medical history, family and social history, medications, and allergies. Complete review of systems was conducted by the mid-level provider and/or myself. Review of systems is negative except as documented in the history of present illness. Brief HPI: 40-year-old male presents the emergency department with chief complaint increasing shortness of breath with thick sputum production. He was recently admitted for similar and dc'd to home. Note that family are not currently available in the room for my H&P and I tried calling the son and kory martin (both emergency contacts)-patient appears to be demented and is unable to provide history. Physical Exam: General: Patient is in no acute distress   Head: Normocephalic, atraumatic, pupils are equal and reactive to light. EOMI. Neck: Neck is supple. No JVD noted. Heart: RRR no murmurs, rubs, or gallops   Lungs: Coarse bilaterally left greater than right. Abdomen: soft, non-tender, non-distended   Extremities: no lower extremity edema. Capillary refill is less than 2 seconds   Skin: no cyanosis or pallor; no rashes noted   Neuro: CN's 2-12 are grossly intact. No focal neurologic deficit appreciated. Confused. Alert to person only. Cardiac work-up, lactic, blood cultures initiated. Patient has possible left lower lobe pneumonia as visualized on chest x-ray and left lower lobe crackles. Due to recent hospitalization vancomycin and cefepime were initiated for nosocomial coverage. Aspirin ordered because he has an elevated troponin. I called to both the emergency contacts to discuss recommendations on further care with them as patient is DNR CC upon review of the last discharge summary.   However I am unable to get a hold of anybody currently. Note the patient became agitated and he does have Haldol listed on his medication list so Haldol and Benadryl was ordered due to the fact the patient was agitated. We did order a CT chest noncontrast to further visualize the lung fields however currently I think the patient is too agitated to undergo this. We will reevaluate. EKG: EKG intubation by ED physician: Demand pacemaker intrinsic region appears to be A. fib with no obvious ischemic changes. 67 bpm.  Normal axis      Update: Spoke with the son who is in the room. Apparently he has a caregiver who usually takes care of him at home but she is admitted to the hospital right now. He has a having increasing shortness of breath. No associated chest pains that we know of but he has a history of dementia. Currently they are unable to care for him at the house. Therefore we will have to discuss possible admission for placement. He would like to speak with hospice as well potentially      Note that the social work came down here and had hospice come talk with family. They do not want hospice. He does not have a caregiver at home to care for him. He will need to be brought in as he is unsafe to be at home currently. FINAL IMPRESSION     1. Pneumonia due to organism    2. Elevated troponin            Electronically signed by:   Jamee Chowdhury DO  06/23/19 1647

## 2019-06-23 NOTE — ED NOTES
Son and family member that is [address] patient are currently in patients room telling patient that he is ready to go to [facility]. Reinforced stable vital signs at this time. No active signs of death noted. Pt does have bilateral rhonchi- Daughter is concerned that patient has had diarrhea at home. Pt is diarrhea free in the ED.       Pipo Sears RN  06/23/19 Adelita Cervantes RN  06/23/19 [id number]

## 2019-06-23 NOTE — ED NOTES
Pt arrived via [employer]  EMS. Alert to person. Sent in by son for SOB. Pt VSS. Sats 96% on room air, skin is pink and dry. Bilateral rhonchi is noted.       Yolis Stevens RN  06/23/19 9712

## 2019-06-23 NOTE — ED NOTES
Telephone report to [address] [place] [address]. Pt stable for transport.       Josh Hidalgo RN  06/23/19 8437

## 2019-06-23 NOTE — ED NOTES
Bed: 19  Expected date:   Expected time:   Means of arrival: [facility]  Comments:  Regan Middleton, RN  06/23/19 0895
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
## 2019-06-23 NOTE — PROGRESS NOTES
Spoke with Dr. Hero Hickman regarding pt's increased agitation, attempts to get up out of bed. Haldol per orders. Camera placed in room. Son at bedside. Pt disoriented, unable to follow commands. Order obtained for . Clinical notified of order for .  Maria Del Rosario Herron RN

## 2019-06-23 NOTE — ED NOTES
Pt was medicated for agitation prior to CT. Pt screams out if son is not in room. Takes gown off, underpants,ekg stickers, bp cuff off repeatedly, unable to follow simple commands. Son is currently visiting family member on [address].       Tino Garcia, COLLEEN  06/23/19 Chilo Jovel RN  06/23/19 4655

## 2019-06-24 LAB
EKG ATRIAL RATE: 107 BPM
EKG DIAGNOSIS: NORMAL
EKG Q-T INTERVAL: 398 MS
EKG QRS DURATION: 76 MS
EKG QTC CALCULATION (BAZETT): 420 MS
EKG R AXIS: 35 DEGREES
EKG T AXIS: 95 DEGREES
EKG VENTRICULAR RATE: 67 BPM
REPORT: NORMAL
RESPIRATORY PANEL PCR: NORMAL

## 2019-06-24 PROCEDURE — 97162 PT EVAL MOD COMPLEX 30 MIN: CPT

## 2019-06-24 PROCEDURE — 96376 TX/PRO/DX INJ SAME DRUG ADON: CPT

## 2019-06-24 PROCEDURE — 6360000002 HC RX W HCPCS: Performed by: HOSPITALIST

## 2019-06-24 PROCEDURE — 2580000003 HC RX 258: Performed by: INTERNAL MEDICINE

## 2019-06-24 PROCEDURE — 6360000002 HC RX W HCPCS: Performed by: PHYSICIAN ASSISTANT

## 2019-06-24 PROCEDURE — 87633 RESP VIRUS 12-25 TARGETS: CPT

## 2019-06-24 PROCEDURE — 87798 DETECT AGENT NOS DNA AMP: CPT

## 2019-06-24 PROCEDURE — 87581 M.PNEUMON DNA AMP PROBE: CPT

## 2019-06-24 PROCEDURE — 94760 N-INVAS EAR/PLS OXIMETRY 1: CPT

## 2019-06-24 PROCEDURE — G0378 HOSPITAL OBSERVATION PER HR: HCPCS

## 2019-06-24 PROCEDURE — 96375 TX/PRO/DX INJ NEW DRUG ADDON: CPT

## 2019-06-24 PROCEDURE — 93010 ELECTROCARDIOGRAM REPORT: CPT | Performed by: INTERNAL MEDICINE

## 2019-06-24 PROCEDURE — 97530 THERAPEUTIC ACTIVITIES: CPT

## 2019-06-24 PROCEDURE — 87486 CHLMYD PNEUM DNA AMP PROBE: CPT

## 2019-06-24 RX ORDER — LORAZEPAM 2 MG/ML
0.25 INJECTION INTRAMUSCULAR EVERY 6 HOURS PRN
Status: DISCONTINUED | OUTPATIENT
Start: 2019-06-24 | End: 2019-06-28 | Stop reason: HOSPADM

## 2019-06-24 RX ORDER — CETIRIZINE HYDROCHLORIDE 10 MG/1
5 TABLET ORAL DAILY
Status: DISCONTINUED | OUTPATIENT
Start: 2019-06-25 | End: 2019-06-28 | Stop reason: HOSPADM

## 2019-06-24 RX ORDER — LORAZEPAM 2 MG/ML
1 INJECTION INTRAMUSCULAR ONCE
Status: COMPLETED | OUTPATIENT
Start: 2019-06-24 | End: 2019-06-24

## 2019-06-24 RX ORDER — LORAZEPAM 2 MG/ML
0.5 INJECTION INTRAMUSCULAR EVERY 6 HOURS
Status: DISCONTINUED | OUTPATIENT
Start: 2019-06-24 | End: 2019-06-28 | Stop reason: HOSPADM

## 2019-06-24 RX ORDER — LORAZEPAM 2 MG/ML
INJECTION INTRAMUSCULAR
Status: DISPENSED
Start: 2019-06-24 | End: 2019-06-24

## 2019-06-24 RX ORDER — MORPHINE SULFATE 2 MG/ML
1 INJECTION, SOLUTION INTRAMUSCULAR; INTRAVENOUS EVERY 4 HOURS PRN
Status: DISCONTINUED | OUTPATIENT
Start: 2019-06-24 | End: 2019-06-28 | Stop reason: HOSPADM

## 2019-06-24 RX ADMIN — Medication 10 ML: at 20:25

## 2019-06-24 RX ADMIN — LORAZEPAM 0.5 MG: 2 INJECTION INTRAMUSCULAR; INTRAVENOUS at 20:25

## 2019-06-24 RX ADMIN — Medication: at 00:18

## 2019-06-24 RX ADMIN — MORPHINE SULFATE 1 MG: 2 INJECTION, SOLUTION INTRAMUSCULAR; INTRAVENOUS at 13:08

## 2019-06-24 RX ADMIN — LORAZEPAM 1 MG: 2 INJECTION INTRAMUSCULAR; INTRAVENOUS at 03:18

## 2019-06-24 RX ADMIN — LORAZEPAM 0.25 MG: 2 INJECTION INTRAMUSCULAR; INTRAVENOUS at 08:55

## 2019-06-24 ASSESSMENT — PAIN SCALES - PAIN ASSESSMENT IN ADVANCED DEMENTIA (PAINAD)
FACIALEXPRESSION: 0
CONSOLABILITY: 0
TOTALSCORE: 3
BODYLANGUAGE: 1
TOTALSCORE: 3
NEGVOCALIZATION: 2
CONSOLABILITY: 0
BREATHING: 1
FACIALEXPRESSION: 0
TOTALSCORE: 5
BREATHING: 1
FACIALEXPRESSION: 0
NEGVOCALIZATION: 2
BODYLANGUAGE: 1
BODYLANGUAGE: 1
CONSOLABILITY: 1
TOTALSCORE: 5
CONSOLABILITY: 1
BODYLANGUAGE: 1
BREATHING: 1
BODYLANGUAGE: 1
CONSOLABILITY: 1
BODYLANGUAGE: 1
FACIALEXPRESSION: 0
BREATHING: 1
NEGVOCALIZATION: 2
BREATHING: 1
NEGVOCALIZATION: 2
BREATHING: 1
TOTALSCORE: 5
FACIALEXPRESSION: 0
TOTALSCORE: 5
NEGVOCALIZATION: 2
FACIALEXPRESSION: 0
FACIALEXPRESSION: 0
CONSOLABILITY: 1
CONSOLABILITY: 1
NEGVOCALIZATION: 1
NEGVOCALIZATION: 1
BREATHING: 1
TOTALSCORE: 5
BODYLANGUAGE: 1

## 2019-06-24 ASSESSMENT — PAIN SCALES - GENERAL
PAINLEVEL_OUTOF10: 0
PAINLEVEL_OUTOF10: 5
PAINLEVEL_OUTOF10: 0
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
## 2019-06-24 NOTE — PROGRESS NOTES
2059: Shift assessment completed, patient is alert but confuse, VSS, fall precautions in place, bed at lowest position, sitter at bed side. Pt doesn't appear to be in pain. (see flowsheet). Will monitor pt. The care plan and education has been reviewed and mutually agreed upon with the patient. 2250: Haldol given for agitation, will monitor pt.     0400: Onetime Ativan given for restless and agitation. Pt is sleeping in bed at the moment. Will monitor pt.    0720: Report given to Jacklyn MACIAS at bedside, patient is sleeping in bed, sitting in room.
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
## 2019-06-24 NOTE — PROGRESS NOTES
Pt admitted to [facility]. Son at bedside. Pt mumbles does not follow commands. Vitals obtained head to toe. Need stool sample for Cdiff pt had diarrhea prior to admit per care giver. Sitter in room for pt safety.

## 2019-06-24 NOTE — PROGRESS NOTES
place, Bed alarm in place, Call light within reach, Gait belt, Patient at risk for falls, Left in bed, Telesitter in use, Nurse notified  Restraints  Initially in place: No    G-Code       OutComes Score     AM-PAC Score  AM-PAC Inpatient Mobility Raw Score : 9 (06/24/19 0956)  AM-PAC Inpatient T-Scale Score : 30.55 (06/24/19 0956)  Mobility Inpatient CMS 0-100% Score: 81.38 (06/24/19 0956)  Mobility Inpatient CMS G-Code Modifier : CM (06/24/19 9527)          Goals  Short term goals  Time Frame for Short term goals: upon d/c  Short term goal 1: Pt will perform bed mobility with mod A x1. Short term goal 2: Pt will perform sit<> stand with max A x1. Short term goal 3: Pt will ambulate 5' with LRAD and max A x1. Long term goals  Time Frame for Long term goals : STG = LTG  Patient Goals   Patient goals : unable to state       Therapy Time   Individual Concurrent Group Co-treatment   Time In 0834         Time Out 0912         Minutes 38              Timed Code Treatment Minutes:   23    Total Treatment Minutes:  38    Anil Izaguirre, ELPIDIO    PT providing direct supervision during session and assisting in making skilled judgements throughout session.   [id number] [facility] [place] PT, DPT [id number]    [id number] Shey Gardner, PT

## 2019-06-25 PROBLEM — E43 SEVERE MALNUTRITION (HCC): Chronic | Status: ACTIVE | Noted: 2019-06-25

## 2019-06-25 PROCEDURE — G0378 HOSPITAL OBSERVATION PER HR: HCPCS

## 2019-06-25 PROCEDURE — 97166 OT EVAL MOD COMPLEX 45 MIN: CPT

## 2019-06-25 PROCEDURE — 2580000003 HC RX 258: Performed by: INTERNAL MEDICINE

## 2019-06-25 PROCEDURE — 6360000002 HC RX W HCPCS: Performed by: HOSPITALIST

## 2019-06-25 PROCEDURE — 94760 N-INVAS EAR/PLS OXIMETRY 1: CPT

## 2019-06-25 PROCEDURE — 96375 TX/PRO/DX INJ NEW DRUG ADDON: CPT

## 2019-06-25 PROCEDURE — 96372 THER/PROPH/DIAG INJ SC/IM: CPT

## 2019-06-25 PROCEDURE — 6360000002 HC RX W HCPCS: Performed by: INTERNAL MEDICINE

## 2019-06-25 PROCEDURE — 97110 THERAPEUTIC EXERCISES: CPT

## 2019-06-25 PROCEDURE — 97530 THERAPEUTIC ACTIVITIES: CPT

## 2019-06-25 PROCEDURE — 96376 TX/PRO/DX INJ SAME DRUG ADON: CPT

## 2019-06-25 RX ADMIN — ENOXAPARIN SODIUM 40 MG: 40 INJECTION SUBCUTANEOUS at 10:53

## 2019-06-25 RX ADMIN — Medication 10 ML: at 23:47

## 2019-06-25 RX ADMIN — LORAZEPAM 0.5 MG: 2 INJECTION INTRAMUSCULAR; INTRAVENOUS at 16:49

## 2019-06-25 RX ADMIN — Medication 10 ML: at 10:54

## 2019-06-25 RX ADMIN — LORAZEPAM 0.5 MG: 2 INJECTION INTRAMUSCULAR; INTRAVENOUS at 23:42

## 2019-06-25 ASSESSMENT — PAIN SCALES - PAIN ASSESSMENT IN ADVANCED DEMENTIA (PAINAD)
FACIALEXPRESSION: 0
FACIALEXPRESSION: 0
BREATHING: 0
BODYLANGUAGE: 0
BREATHING: 0
BREATHING: 0
NEGVOCALIZATION: 0
FACIALEXPRESSION: 0
CONSOLABILITY: 0
CONSOLABILITY: 0
BREATHING: 0
TOTALSCORE: 1
FACIALEXPRESSION: 0
BODYLANGUAGE: 0
TOTALSCORE: 0
FACIALEXPRESSION: 0
CONSOLABILITY: 0
BREATHING: 0
NEGVOCALIZATION: 0
CONSOLABILITY: 0
CONSOLABILITY: 0
BREATHING: 0
CONSOLABILITY: 0
TOTALSCORE: 0
BODYLANGUAGE: 0
FACIALEXPRESSION: 0
NEGVOCALIZATION: 0
TOTALSCORE: 0
BODYLANGUAGE: 0
BODYLANGUAGE: 0
NEGVOCALIZATION: 1
CONSOLABILITY: 0
NEGVOCALIZATION: 0
BODYLANGUAGE: 0
FACIALEXPRESSION: 0
BREATHING: 0
BREATHING: 0
NEGVOCALIZATION: 0
CONSOLABILITY: 1
BREATHING: 0
FACIALEXPRESSION: 2
CONSOLABILITY: 0
TOTALSCORE: 0
TOTALSCORE: 0
FACIALEXPRESSION: 0
TOTALSCORE: 0
NEGVOCALIZATION: 0
CONSOLABILITY: 0
BODYLANGUAGE: 0
CONSOLABILITY: 0
NEGVOCALIZATION: 0
BREATHING: 0
TOTALSCORE: 0
BREATHING: 1
NEGVOCALIZATION: 0
BODYLANGUAGE: 0
TOTALSCORE: 5
TOTALSCORE: 0
NEGVOCALIZATION: 0
NEGVOCALIZATION: 0
BODYLANGUAGE: 0
FACIALEXPRESSION: 0
TOTALSCORE: 0
CONSOLABILITY: 0
BREATHING: 1
NEGVOCALIZATION: 0
BODYLANGUAGE: 0
FACIALEXPRESSION: 0
TOTALSCORE: 0
BODYLANGUAGE: 0
FACIALEXPRESSION: 0
BODYLANGUAGE: 0

## 2019-06-25 NOTE — FLOWSHEET NOTE
Assessment complete. BP slightly elevated. Patient resting in bed, very verbal but incomprehensive. Respirations even and easy. Gave scheduled ativan and warm blanket, Pt. Asleep almost immediately. Will continue to monitor/assess.         06/24/19 2021   Vital Signs   Temp 97.9 °F (36.6 °C)   Temp Source Axillary   Pulse 85   Heart Rate Source Monitor   Resp 18   BP (!) 153/89   BP Location Left upper arm   BP Upper/Lower Upper   MAP (mmHg) 110   Patient Position Semi fowlers   Level of Consciousness 1   MEWS Score 2   Patient Currently in Pain Other (comment)  (mummbles)   Pain Assessment   Pain Assessment Advanced Dementia   PAINAD (Pain Assessment in Advance Dementia)   Breathing 1   Negative Vocalization 1   Facial Expression 0   Body Language 1   Consolability 0   PAINAD Score 3   Oxygen Therapy   SpO2 95 %   Pulse Oximeter Device Mode Intermittent   Pulse Oximeter Device Location Finger   O2 Device None (Room air)

## 2019-06-25 NOTE — CARE COORDINATION
Aware that  [facility] has met w/pt and Renato Stein and Renato Stein stated she is interested in [facility] options. Met w/Jessica, who stated that she would like to see if pt can go to [facility] for respite stay, then transition to home if possible. Left message for Sw at [place] to determine ability to review pt.   Electronically signed by MERE Chadwick on 6/25/2019 at 2:29 PM
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
## 2019-06-25 NOTE — PROGRESS NOTES
Chapincito Naik RN met with YESENIANISHI Karen and patient's son. Patient eligible for acute care at hospice inpatient unit and this explored as well as respite stay at hospice inpatient . José Antonio Joe did not want hospice enrollment today; instead José Antonio Joe requests that the [facility] be contacted to see if patient can go to a [place] facility for respite until she can bring him home which she anticipates is 1 week to help her recover . She states she has worked with [place] before on benefits for patient. [facility] notified . [facility] will follow up tomorrow . Please call for any needs . Thank you for the opportunity to speak with 's family. Sandi Goldsmith RN, BSN, [facility] Liaison  [address] [place]: [phone]  C: [address] [facility],[address]  F: [phone]  Referrals and Information: [address] [place] [address]. [facility]  [email or web address]

## 2019-06-25 NOTE — PROGRESS NOTES
ELOISE Sabillon and Son called to get updates on patient. Explained patient is currently sleeping after receiving ativan IV and warm blanket. Gave phone number. Will continue to assess/monitor.

## 2019-06-25 NOTE — PROGRESS NOTES
Occupational Therapy   Occupational Therapy Initial Assessment  Date: 2019   Patient Name: Geeta Monroy  MRN: [id number]     : 3/6/1923    Date of Service: 2019    Discharge Recommendations:  Geeta Monroy scored a 15/24 on the AM-PAC ADL Inpatient form. Current research shows that an AM-PAC score of 17 or less is typically not associated with a discharge to the patient's home setting. Based on the patients AM-PAC score and their current ADL deficits, it is recommended that the patient have 3-5 sessions per week of Occupational Therapy at d/c to increase the patients independence. OT Equipment Recommendations  Equipment Needed: No    Assessment   Performance deficits / Impairments: Decreased functional mobility ; Decreased ADL status; Decreased endurance;Decreased safe awareness  Assessment: Pt is not at his baseline of occupational function as evidenced by the above deficits associated w/ advanced dementia. Pt would benefit from continued skilled acute OT services to address these deficits. Treatment Diagnosis: Decreased functional mobility, ADL status, endurance, and safety awareness associated w/ advanced dementia  Prognosis: Fair  Decision Making: Medium Complexity  History: Pt is 89yo w/ advanced dementia, has caregiver at home to assist w/ ADLs/IADLs. PMH: PNA, A fib, Colon Ca, HLD, HTN, pacemaker  Exam: ROM, MMT, 6-Clicks, 4 performance deficits/impairments, stable presentation  Assistance / Modification: SBA sitting EOB, Min A varying to Min A of 2 for sit<>stand  Patient Education: OT eval, POC, d/c recommendation  Barriers to Learning: cognition, language  REQUIRES OT FOLLOW UP: Yes  Activity Tolerance  Activity Tolerance: Treatment limited secondary to decreased cognition  Safety Devices  Safety Devices in place: Yes  Type of devices: All fall risk precautions in place; Patient at risk for falls; Left in chair;Call light within reach;Nurse notified; Chair alarm in place;Gait belt  Restraints  Initially in place: No           Patient Diagnosis(es): The primary encounter diagnosis was Pneumonia due to organism. A diagnosis of Elevated troponin was also pertinent to this visit. has a past medical history of A-fib ([facility] [address] Cancer ([facility] [address] Hyperlipemia, Hypertension, and Thyroid disease. has a past surgical history that includes colectomy and pacemaker placement. Treatment Diagnosis: Decreased functional mobility, ADL status, endurance, and safety awareness associated w/ advanced dementia      Restrictions  Restrictions/Precautions  Restrictions/Precautions: Fall Risk(high fall risk )  Required Braces or Orthoses?: No  Position Activity Restriction  Other position/activity restrictions: Zenaida Meza is a 80 y.o. male who presents to the emergency department brought in by EMS with reports of SOB. Patient apparently recently diagnosed with pneumonia and generalized weakness. Unknown if patient is currently on antibiotics. Patient is pleasantly demented. Patient unable to provide any history, no family here presently. Apparently patient's normal caregiver was recently admitted to the hospital just yesterday. Patient is a DNR CC. At time of discharge on the 13th they discussed outpatient follow-up with hospice [facility]. Apparently this was never done. Family would still like hospice consult. Patient unable to provide any information or participate in current HPI    Subjective   General  Chart Reviewed: Yes  Family / Caregiver Present: Yes(Hospital ordered  present at beginning of session, leaving and then returning at end of session)  Referring Practitioner: Curry Montoya MD  Diagnosis: Advanced Dementia  Subjective  Subjective: Pt supine in bed at arrival. Pt sleeping, waking with encouragement. Pt pleasant and agreeable to OT eval. Pt difficult to understand at times but awake and oriented.   General Comment  Comments: Family having meeting with hospice today. Patient Currently in Pain: Denies(Simultaneous filing. User may not have seen previous data.)  Vital Signs  Patient Currently in Pain: Denies(Simultaneous filing. User may not have seen previous data.)  Social/Functional History  Social/Functional History  Type of Home: House  Receives Help From: Personal care attendant  ADL Assistance: Independent(with RW)  Occupation: Other(comment)()  Leisure & Hobbies: Pt reported he likes \"resting\" when asked what he does for fun  Additional Comments: Pt poor historian d/t advanced dementia. Objective   Vision: Within Functional Limits  Hearing: Exceptions to [facility]  Hearing Exceptions: Bilateral hearing aid    Orientation  Overall Orientation Status: Impaired  Orientation Level: Oriented to place;Oriented to person;Disoriented to time(Pt difficult to understand, unable to determine orientation for situation)     Balance  Sitting Balance: Stand by assistance(Sitting EOB for ~20 min)  Standing Balance: Dependent/Total(Bed elevated; Min A of 2 for 1st stand; Min A of 1 second stand w/ stand pivot transfer)  Standing Balance  Time: ~1-2 min; ~1-2 min  Activity: standing from EOB w/ standard walker, taking a few side steps; standing for stand pivot transfer w/ a few side steps towards recliner w/ standard walker  Functional Mobility  Functional - Mobility Device: Standard Walker  Activity: Other(side stepping to L closer to [facility]; side steps for stand pivot transfer to recliner)  Functional Mobility Comments: Pt requiring vc's and increased time for side steps by EOB and to recliner. Pt reporting he thought he was falling when stepping back towards chair with stand pivot transfer, pt reassured he was not falling. ADL  Feeding: Moderate assistance(Mod A for bringing cup of soda to mouth. Pt reported feeling like he needed to spit after drinking w/ pt spitting out soda. On seconds attempt w/ water and straw pt swallowed the water w/ slight cough after.  RN aware)  Grooming: Moderate assistance;Setup(Mod A for combing hair w/ vc's. Setup for washing face w/ vc's.)  LE Dressing: Dependent/Total(socks)  Toileting: Unable to assess(comment)(Pt reporting needing to use the toilet. After pt presented w/ [facility] and asked if he needed to have  BM he said \"no, do you?')  Additional Comments: Pt needing vcing for completing grooming tasks sitting EOB  Tone RUE  RUE Tone: Normotonic  Tone LUE  LUE Tone: Normotonic  Coordination  Movements Are Fluid And Coordinated: No  Coordination and Movement description: Decreased speed     Bed mobility  Rolling to Left: Maximum assistance  Rolling to Right: Maximum assistance  Supine to Sit: Dependent/Total(mod/ max x2 )  Sit to Supine: Unable to assess(up in chair at end of session )  Comment: Pt requiring vc's for bed mobility; HOB elevated  Transfers  Stand Pivot Transfers: Dependent/Total(Min A of 1 and CGA of 1 to recliner from EOB w/ side steps turning to recliner)  Sit to stand: Dependent/Total(Min A of 2 1st attempt from EOB w/ standard walker and vc's; Min A of 1 second stand from EOB w/ standard walker and vc's)  Vision - Basic Assessment  Prior Vision: No visual deficits  Visual History: No significant visual history  Patient Visual Report: No visual complaint reported. Visual Field Cut: Not tested  Cognition  Overall Cognitive Status: Exceptions  Arousal/Alertness: Appropriate responses to stimuli  Following Commands: Follows one step commands with increased time; Inconsistently follows commands  Attention Span: Attends with cues to redirect  Memory: Decreased short term memory  Safety Judgement: Decreased awareness of need for safety  Problem Solving: Decreased awareness of errors  Cognition Comment: Pt delayed response to verbal and tactile cues, inconsistently responding to directions w/ repetition        Sensation  Overall Sensation Status: WFL        LUE AROM (degrees)  LUE AROM : WFL  Left Hand AROM (degrees)  Left Hand AROM: WFL  RUE AROM (degrees)  RUE AROM : WFL  Right Hand AROM (degrees)  Right Hand AROM: WFL  LUE Strength  Gross LUE Strength: WFL  L Hand General: 3+/5  LUE Strength Comment: Pt able to attend to directions for initiating MMT  RUE Strength  Gross RUE Strength: WFL  R Hand General: 3+/5  RUE Strength Comment: Pt able to attend to directions for initiating MMT                   Plan   Plan  Times per week: 1-2  Times per day: Daily  Current Treatment Recommendations: Strengthening, ROM, Self-Care / ADL, Functional Mobility Training, Endurance Training      AM-PAC Score        AM-[facility] Inpatient Daily Activity Raw Score: 15 (06/25/19 1242)  AM-PAC Inpatient ADL T-Scale Score : 34.69 (06/25/19 1242)  ADL Inpatient CMS 0-100% Score: 56.46 (06/25/19 1242)  ADL Inpatient CMS G-Code Modifier : CK (06/25/19 1242)    Goals  Short term goals  Time Frame for Short term goals: Discharge  Short term goal 1: Min A toilet transfer using verbal and tactile cues w/ appropriate AE  Short term goal 2: Min A grooming using verbal and tactile cues   Short term goal 3: Min A UB dressing/bathing using verbal and tactile cues  Short term goal 4: Mod A don/doff pants/LBbathing using verbal and tactile cues       Therapy Time   Individual Concurrent Group Co-treatment   Time In 1117         Time Out 1203         Minutes 46              Timed Code Treatment Minutes:   26 min (Minutes shared with PT d/t pt observation status)    Total Treatment Minutes:  Eliecer S/OT  C/ Faye 66, OT   I have directly observed this treatment and have read and approve this note.    Blade Rodriguez, OTR/L, [id number]

## 2019-06-25 NOTE — PROGRESS NOTES
Physical Therapy  Facility/Department: [address] ONCOLOGY  Daily Treatment Note  NAME: Costella Cheadle  : 3/6/1923  MRN: [id number]    Date of Service: 2019    Discharge Recommendations:        Costella Cheadle scored a  on the AM-PAC short mobility form. Current research shows that an AM-PAC score of 17 or less is typically not associated with a discharge to the patient's home setting. Based on the patients AM-PAC score and their current functional mobility deficits, it is recommended that the patient have 3-5 sessions per week of Physical Therapy at d/c to increase the patients independence. Assessment   Body structures, Functions, Activity limitations: Decreased functional mobility ; Decreased ADL status; Decreased strength;Decreased safe awareness;Decreased cognition;Decreased endurance;Decreased balance;Decreased coordination  Assessment: Pt required min x 1 + CGA x 1 to min assist x 2 with transfers this date, as well as improved ability to follow commands and verbalize response. Hospice consult scheduled for later this date. Pt to benefit from PT as tolerate to improve mobility and address above deficits. Treatment Diagnosis: decreased strength, balance and cognition   Prognosis: Good  History: a fib, thyroid disease, colon cancer  Exam: ROM, strength, functional mobility  Clinical Presentation: evolving  Patient Education: reason for PT eval  Barriers to Learning: cognition  REQUIRES PT FOLLOW UP: Yes  Activity Tolerance  Activity Tolerance: Patient limited by cognitive status     Patient Diagnosis(es): The primary encounter diagnosis was Pneumonia due to organism. A diagnosis of Elevated troponin was also pertinent to this visit. has a past medical history of A-fib ([facility] [address] Cancer ([facility] [address] Hyperlipemia, Hypertension, and Thyroid disease. has a past surgical history that includes colectomy and pacemaker placement.     Restrictions  Restrictions/Precautions  Restrictions/Precautions: Fall Risk(high fall risk )  Required Braces or Orthoses?: No  Position Activity Restriction  Other position/activity restrictions: Germain Coburn is a 80 y.o. male who presents to the emergency department brought in by EMS with reports of SOB. Patient apparently recently diagnosed with pneumonia and generalized weakness. Unknown if patient is currently on antibiotics. Patient is pleasantly demented. Patient unable to provide any history, no family here presently. Apparently patient's normal caregiver was recently admitted to the hospital just yesterday. Patient is a DNR CC. At time of discharge on the  they discussed outpatient follow-up with [facility] [place]. Apparently this was never done. Family would still like hospice consult. Patient unable to provide any information or participate in current HPI  Subjective   General  Chart Reviewed: Yes  Additional Pertinent Hx: A fib, colon cancer, thyroid disease, HTN  Response To Previous Treatment: Not applicable  Subjective  Subjective: Pt able to verbalize some words this date, no reports of pain. Pt mumbling with speech, but attempting to answer with inconsistent intelligible level   General Comment  Comments: Nursing ok to treat prior to session, Hospice consult planned later this date. Orientation  Orientation  Overall Orientation Status: Impaired(oriented x person and  )  Cognition   Cognition  Overall Cognitive Status: Exceptions  Arousal/Alertness: Appropriate responses to stimuli  Following Commands: Follows one step commands with increased time; Inconsistently follows commands  Attention Span: Attends with cues to redirect  Memory: Decreased short term memory  Safety Judgement: Decreased awareness of need for safety  Problem Solving: Decreased awareness of errors  Objective   Bed mobility  Rolling to Left: Maximum assistance  Rolling to Right: Maximum assistance  Supine to Sit: Dependent/Total(mod/ max x2 )  Sit to Supine: Unable to assess(up in chair at end of session )  Transfers  Sit to Stand: Dependent/Total  Stand to sit: Dependent/Total  Bed to Chair: Dependent/Total  Comment: Pt sit to stand from elevated bed with min x 2 fist stand, and min x 1 + CGAx1 on seconds stand. SPT with min x 1 + CGAx1 with SW  Ambulation  Ambulation?: Yes  More Ambulation?: No  Ambulation 1  Surface: level tile  Device: Standard Walker  Assistance: Dependent/Total  Quality of Gait: short step height and length, max assist for walker negotiation   Gait Deviations: Slow Allyssa;Decreased step length;Decreased step height;Shuffles  Distance: Pt took 2-3 steps laterally toward head of bed initially, then 4-5 turning steps from bed to reclining chair   Comments: Assist for walker management. Stairs/Curb  Stairs?: No     Balance  Posture: Fair  Sitting - Static: Fair;+  Sitting - Dynamic: Fair;+  Standing - Static: Fair  Standing - Dynamic: Fair;-  Exercises  Hip Flexion: x 7-8 Brittani   Knee Long Arc Quad: ~10 brittani   Ankle Pumps: ~10 Brittani   Comments: All ther ex performed EOB   AROM RLE (degrees)  RLE AROM: WFL  AROM LLE (degrees)  LLE AROM : WFL  Strength RLE  Strength RLE: WFL  Comment: did not formally assess this date. based on functional mobility with transfers 3+/5  Strength LLE  Strength LLE: WFL  Comment: did not formally assess this date. based on functional mobility with transfers 3+/5  Comment: Pt in bed upon arrival, supine to sit with mod/ max x 2. Pt sat EOB x 20 minutes while participating in simple ther ex and grooming tasks. pt with increased ability to answer questions ( although intelligible at times) as well as increased ability to follow simple 1 step commands this date. Pt sit to stand from elevated bed with min x2, stand 1.5 minutes, taking several lateral steps toward head of bed. Return to sit. pt wanting to remain upright, positioned chair and requested nursing permission to get up in chair. OK per nursing.  Pt Sit to stand again with min x 1 +

## 2019-06-25 NOTE — CARE COORDINATION
Aware that POA has decided that she may not be able to care for pt and is agreeable to meet w/HOC.   HOC to meet w/pt and family today at 1pm.  Electronically signed by MERE Metzger on 6/25/2019 at 10:52 AM

## 2019-06-25 NOTE — PROGRESS NOTES
Palliative Care:     Sharron Bhatti from [facility] called, reports she will be in shortly to see patient.

## 2019-06-25 NOTE — PROGRESS NOTES
Nutrition Assessment    Type and Reason for Visit: Positive Nutrition Screen(MST 2, supplement recommendations, malnutrition, difficulty chewing/swallowing )    Nutrition Recommendations:   1. Trial magic cup BID  2. Monitor plan of care   3. Consider SLP evaluation     Nutrition Assessment: Difficult to assess pt's nutrition status upon admission as pt with advanced dementia and no family or caregiver at bedside. Per RN, pt has not had any PO intake over the past 3 days. Per weight hx in EMR, pt with 32 lb (20%) weight loss over the past 2 weeks.  lbs (RN double checked weight with bed scale). UBW appears to be 158-159 lbs per EMR. Unclear if weight hx is inaccurate, bed scale is inaccurate, or if this is true weight loss. Will order magic cup BID. Hospice consult noted. Will monitor plan of care and PO intake. Malnutrition Assessment:  · Malnutrition Status: Meets the criteria for severe malnutrition  · Context: Chronic illness  · Findings of the 6 clinical characteristics of malnutrition (Minimum of 2 out of 6 clinical characteristics is required to make the diagnosis of moderate or severe Protein Calorie Malnutrition based on AND/ASPEN Guidelines):  1. Energy Intake-Less than or equal to 50% of estimated energy requirement(x 3 days ),      2. Weight Loss-Unable to assess,    3. Fat Loss-Severe subcutaneous fat loss, Triceps  4. Muscle Loss-Severe muscle mass loss, Clavicles (pectoralis and deltoids)  5. Fluid Accumulation-No significant fluid accumulation,    6.   Strength-Not measured    Nutrition Risk Level: High    Nutrient Needs:  · Estimated Daily Total Kcal: 0474-7979   · Estimated Daily Protein (g): 68-86 grams   · Estimated Daily Total Fluid (ml/day): 1 mL per kcal     Nutrition Diagnosis:   · Problem: Inadequate oral intake  · Etiology: related to Insufficient energy/nutrient consumption     Signs and symptoms:  as evidenced by Diet history of poor intake, Weight loss, Severe muscle loss, Severe loss of subcutaneous fat    Objective Information:  · Nutrition-Focused Physical Findings: No edema noted. Na+ 133. Agitated.    · Wound Type: None  · Current Nutrition Therapies:  · Oral Diet Orders: General   · Oral Diet intake: 0%  · Oral Nutrition Supplement (ONS) Orders: None  · Anthropometric Measures:  · Ht: 6' (182.9 cm)   · Current Body Wt: 124 lb (56.2 kg)  · Ideal Body Wt: 178 lb (80.7 kg), % Ideal Body 70%  · BMI Classification: BMI <18.5 Underweight    Nutrition Interventions:   Continue current diet, Start ONS  Continued Inpatient Monitoring, Education Not Indicated    Nutrition Evaluation:   · Evaluation: Goals set   · Goals: Pt will consume at least 50% of meals and supplements     · Monitoring: Meal Intake, Supplement Intake, Diet Tolerance, Mental Status/Confusion, Weight, Pertinent Labs      Electronically signed by Mary Alice Stubbs RD, LD on 6/25/19 at 11:30 AM    Contact Number: [phone]

## 2019-06-26 PROCEDURE — 94760 N-INVAS EAR/PLS OXIMETRY 1: CPT

## 2019-06-26 PROCEDURE — G0378 HOSPITAL OBSERVATION PER HR: HCPCS

## 2019-06-26 PROCEDURE — 96376 TX/PRO/DX INJ SAME DRUG ADON: CPT

## 2019-06-26 PROCEDURE — 6360000002 HC RX W HCPCS: Performed by: HOSPITALIST

## 2019-06-26 PROCEDURE — 2580000003 HC RX 258: Performed by: INTERNAL MEDICINE

## 2019-06-26 RX ADMIN — Medication 10 ML: at 09:10

## 2019-06-26 RX ADMIN — LORAZEPAM 0.5 MG: 2 INJECTION INTRAMUSCULAR; INTRAVENOUS at 16:22

## 2019-06-26 RX ADMIN — LORAZEPAM 0.5 MG: 2 INJECTION INTRAMUSCULAR; INTRAVENOUS at 09:09

## 2019-06-26 RX ADMIN — LORAZEPAM 0.5 MG: 2 INJECTION INTRAMUSCULAR; INTRAVENOUS at 22:43

## 2019-06-26 RX ADMIN — LORAZEPAM 0.5 MG: 2 INJECTION INTRAMUSCULAR; INTRAVENOUS at 03:37

## 2019-06-26 RX ADMIN — MORPHINE SULFATE 1 MG: 2 INJECTION, SOLUTION INTRAMUSCULAR; INTRAVENOUS at 04:19

## 2019-06-26 RX ADMIN — MORPHINE SULFATE 1 MG: 2 INJECTION, SOLUTION INTRAMUSCULAR; INTRAVENOUS at 16:58

## 2019-06-26 RX ADMIN — LORAZEPAM 0.25 MG: 2 INJECTION INTRAMUSCULAR; INTRAVENOUS at 18:45

## 2019-06-26 RX ADMIN — LORAZEPAM 0.25 MG: 2 INJECTION INTRAMUSCULAR; INTRAVENOUS at 12:55

## 2019-06-26 RX ADMIN — LORAZEPAM 0.25 MG: 2 INJECTION INTRAMUSCULAR; INTRAVENOUS at 02:08

## 2019-06-26 RX ADMIN — Medication 10 ML: at 22:47

## 2019-06-26 ASSESSMENT — PAIN SCALES - PAIN ASSESSMENT IN ADVANCED DEMENTIA (PAINAD)
NEGVOCALIZATION: 0
CONSOLABILITY: 2
BREATHING: 1
FACIALEXPRESSION: 0
BREATHING: 0
TOTALSCORE: 0
BODYLANGUAGE: 2
CONSOLABILITY: 0
BODYLANGUAGE: 0
CONSOLABILITY: 0
BODYLANGUAGE: 0
TOTALSCORE: 9
FACIALEXPRESSION: 0
NEGVOCALIZATION: 2
BREATHING: 0
FACIALEXPRESSION: 2
TOTALSCORE: 0
NEGVOCALIZATION: 0

## 2019-06-26 ASSESSMENT — PAIN SCALES - GENERAL: PAINLEVEL_OUTOF10: 6

## 2019-06-26 NOTE — PROGRESS NOTES
Prn ativan administered per orders. Pt reaching out, sweat visible on face, moaning.  Will reassess and medicate as needed

## 2019-06-26 NOTE — CARE COORDINATION
Spoke w/Mary at [address] She stated that voicemail box was full for RN-this was the reason HOC was unable to meet w/family today-family has gone home for the day. Mary stated that Allynelijah Bria has agreed to meet w/HOC tomorrow at 1. She stated that the plan may be for [facility], or possibly [employer]. Sw continuing to follow.   Electronically signed by MERE Jimenez on 6/26/2019 at 4:04 PM

## 2019-06-26 NOTE — PROGRESS NOTES
Palliative Care:     Spoke with Anabela Queen at [facility], informed POA has decided to engage [facility].
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
## 2019-06-27 PROCEDURE — 2580000003 HC RX 258: Performed by: INTERNAL MEDICINE

## 2019-06-27 PROCEDURE — 6360000002 HC RX W HCPCS: Performed by: HOSPITALIST

## 2019-06-27 PROCEDURE — 96376 TX/PRO/DX INJ SAME DRUG ADON: CPT

## 2019-06-27 PROCEDURE — G0378 HOSPITAL OBSERVATION PER HR: HCPCS

## 2019-06-27 PROCEDURE — 94760 N-INVAS EAR/PLS OXIMETRY 1: CPT

## 2019-06-27 RX ADMIN — LORAZEPAM 0.5 MG: 2 INJECTION INTRAMUSCULAR; INTRAVENOUS at 17:21

## 2019-06-27 RX ADMIN — Medication 10 ML: at 23:13

## 2019-06-27 RX ADMIN — MORPHINE SULFATE 1 MG: 2 INJECTION, SOLUTION INTRAMUSCULAR; INTRAVENOUS at 02:29

## 2019-06-27 RX ADMIN — LORAZEPAM 0.5 MG: 2 INJECTION INTRAMUSCULAR; INTRAVENOUS at 21:47

## 2019-06-27 RX ADMIN — Medication 10 ML: at 10:08

## 2019-06-27 RX ADMIN — LORAZEPAM 0.5 MG: 2 INJECTION INTRAMUSCULAR; INTRAVENOUS at 05:01

## 2019-06-27 RX ADMIN — LORAZEPAM 0.5 MG: 2 INJECTION INTRAMUSCULAR; INTRAVENOUS at 10:07

## 2019-06-27 RX ADMIN — MORPHINE SULFATE 1 MG: 2 INJECTION, SOLUTION INTRAMUSCULAR; INTRAVENOUS at 14:14

## 2019-06-27 ASSESSMENT — PAIN SCALES - PAIN ASSESSMENT IN ADVANCED DEMENTIA (PAINAD)
TOTALSCORE: 6
CONSOLABILITY: 0
FACIALEXPRESSION: 1
FACIALEXPRESSION: 0
FACIALEXPRESSION: 0
BODYLANGUAGE: 1
BREATHING: 1
TOTALSCORE: 0
BREATHING: 0
BODYLANGUAGE: 0
NEGVOCALIZATION: 1
BODYLANGUAGE: 0
CONSOLABILITY: 1
CONSOLABILITY: 0
BODYLANGUAGE: 1
CONSOLABILITY: 2
TOTALSCORE: 1
NEGVOCALIZATION: 1
BREATHING: 1
TOTALSCORE: 6
BREATHING: 0
NEGVOCALIZATION: 1
NEGVOCALIZATION: 0
FACIALEXPRESSION: 2

## 2019-06-27 ASSESSMENT — PAIN SCALES - GENERAL
PAINLEVEL_OUTOF10: 6

## 2019-06-27 NOTE — PROGRESS NOTES
Patient noted with increased pain behaviors, moaning , pulling at linen,reaching. Turned and repositioned paient to left side. Will continue to monitor.

## 2019-06-27 NOTE — PROGRESS NOTES
Shift assessment completed. VSS. tuned and repositioned patient. Pt unable to express needs needs . Call light in reach. Bed alarm on. Will continue to monitor.
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
## 2019-06-27 NOTE — PROGRESS NOTES
Occupational/Physical Therapy  Janette Ellison      Attempted to see pt for OT/PT cotx. Per RN, pt has been moaning and not appropriate for therapy today. Will attempt again another day as schedule allows.      Blade Rodriguez, OTR/L, [id number]

## 2019-06-27 NOTE — PLAN OF CARE
Nutrition Problem: Inadequate oral intake  Intervention: Food and/or Nutrient Delivery: Continue current diet, Continue current ONS  Nutritional Goals: Nutrition as appropriate based on plan of care
Nutrition Problem: Inadequate oral intake  Intervention: Food and/or Nutrient Delivery: Continue current diet, Start ONS  Nutritional Goals: Pt will consume at least 50% of meals and supplements
Problem: Falls - Risk of:  Goal: Will remain free from falls  Description  Will remain free from falls  6/24/2019 2157 by Felix Stephens RN  Outcome: Ongoing    Sitter at bedside, alarms active. Problem: Risk for Impaired Skin Integrity  Goal: Tissue integrity - skin and mucous membranes  Description  Structural intactness and normal physiological function of skin and  mucous membranes. 6/24/2019 2157 by Felix Stephens RN  Outcome: Ongoing    Reposition patient. Problem: Pain:  Goal: Pain level will decrease  Description  Pain level will decrease  6/24/2019 2157 by Felix Stephens RN  Outcome: Ongoing    Comfort care.
Problem: Falls - Risk of:  Goal: Will remain free from falls  Description  Will remain free from falls  Outcome: Ongoing  Goal: Absence of physical injury  Description  Absence of physical injury  Outcome: Ongoing     Problem: Risk for Impaired Skin Integrity  Goal: Tissue integrity - skin and mucous membranes  Description  Structural intactness and normal physiological function of skin and  mucous membranes.   Outcome: Ongoing     Problem: Pain:  Goal: Pain level will decrease  Description  Pain level will decrease  Outcome: Ongoing  Goal: Control of acute pain  Description  Control of acute pain  Outcome: Ongoing  Goal: Control of chronic pain  Description  Control of chronic pain  Outcome: Ongoing     Problem: Nutrition  Goal: Optimal nutrition therapy  Outcome: Ongoing
Pt up to chair with therapy sitter at bedside for pt safety.  Pt denies pain
RN  Outcome: Ongoing     Problem: Nutritional:  Goal: Maintenance of adequate nutrition will improve  Description  Maintenance of adequate nutrition will improve  6/27/2019 0304 by Angie Liu RN  Outcome: Ongoing  6/27/2019 0303 by Angie Liu RN  Outcome: Ongoing  Goal: Ability to identify appropriate dietary choices will improve  Description  Ability to identify appropriate dietary choices will improve  6/27/2019 0304 by Angie Liu RN  Outcome: Ongoing  6/27/2019 0303 by Angie Liu RN  Outcome: Ongoing

## 2019-06-27 NOTE — PROGRESS NOTES
<18.5 Underweight    Nutrition Interventions:   Continue current diet, Continue current ONS  Continued Inpatient Monitoring, Education Not Indicated    Nutrition Evaluation:   · Evaluation: Goals set   · Goals: Nutrition as appropriate based on plan of care    · Monitoring: Supplement Intake, Meal Intake, Pertinent Labs, Weight, Mental Status/Confusion      Electronically signed by Randi Marrufo RD, LD on 6/27/19 at 12:12 PM    Contact Number: [phone]

## 2019-06-27 NOTE — PROGRESS NOTES
Physical Therapy  Austin Lucio    Chart reviewed. PT attempted to see patient for follow up treatment although per nursing patient not appropriate for therapy at this time related to increased pain behaviors. Plan to continue to follow as medically appropriate. Pending hospice consult.     Sandie Koo PT, DPT [id number]

## 2019-06-28 VITALS
HEIGHT: 72 IN | RESPIRATION RATE: 23 BRPM | WEIGHT: 124.8 LBS | SYSTOLIC BLOOD PRESSURE: 157 MMHG | TEMPERATURE: 97.9 F | DIASTOLIC BLOOD PRESSURE: 91 MMHG | OXYGEN SATURATION: 93 % | BODY MASS INDEX: 16.9 KG/M2 | HEART RATE: 89 BPM

## 2019-06-28 PROBLEM — F02.80 ALZHEIMER'S DEMENTIA (HCC): Status: ACTIVE | Noted: 2019-06-28

## 2019-06-28 PROBLEM — G30.9 ALZHEIMER'S DEMENTIA (HCC): Status: ACTIVE | Noted: 2019-06-28

## 2019-06-28 LAB
BLOOD CULTURE, ROUTINE: NORMAL
CULTURE, BLOOD 2: NORMAL

## 2019-06-28 PROCEDURE — 2580000003 HC RX 258: Performed by: INTERNAL MEDICINE

## 2019-06-28 PROCEDURE — 96376 TX/PRO/DX INJ SAME DRUG ADON: CPT

## 2019-06-28 PROCEDURE — 6360000002 HC RX W HCPCS: Performed by: HOSPITALIST

## 2019-06-28 PROCEDURE — G0378 HOSPITAL OBSERVATION PER HR: HCPCS

## 2019-06-28 RX ORDER — LORAZEPAM 2 MG/ML
1 CONCENTRATE ORAL EVERY 8 HOURS PRN
Qty: 1 BOTTLE | Refills: 0 | Status: SHIPPED | OUTPATIENT
Start: 2019-06-28 | End: 2019-07-12

## 2019-06-28 RX ORDER — LORAZEPAM 2 MG/ML
1 CONCENTRATE ORAL EVERY 8 HOURS PRN
Status: DISCONTINUED | OUTPATIENT
Start: 2019-06-28 | End: 2019-06-28 | Stop reason: HOSPADM

## 2019-06-28 RX ORDER — OXYCODONE HCL 20 MG/ML
5 CONCENTRATE, ORAL ORAL EVERY 4 HOURS PRN
Status: DISCONTINUED | OUTPATIENT
Start: 2019-06-28 | End: 2019-06-28 | Stop reason: HOSPADM

## 2019-06-28 RX ORDER — OXYCODONE HCL 20 MG/ML
5 CONCENTRATE, ORAL ORAL EVERY 4 HOURS PRN
Qty: 1 BOTTLE | Refills: 0 | Status: SHIPPED | OUTPATIENT
Start: 2019-06-28 | End: 2019-07-01

## 2019-06-28 RX ADMIN — Medication 10 ML: at 09:35

## 2019-06-28 RX ADMIN — LORAZEPAM 0.5 MG: 2 INJECTION INTRAMUSCULAR; INTRAVENOUS at 09:34

## 2019-06-28 RX ADMIN — LORAZEPAM 0.5 MG: 2 INJECTION INTRAMUSCULAR; INTRAVENOUS at 04:21

## 2019-06-28 RX ADMIN — LORAZEPAM 0.5 MG: 2 INJECTION INTRAMUSCULAR; INTRAVENOUS at 15:29

## 2019-06-28 RX ADMIN — MORPHINE SULFATE 1 MG: 2 INJECTION, SOLUTION INTRAMUSCULAR; INTRAVENOUS at 16:09

## 2019-06-28 ASSESSMENT — PAIN SCALES - PAIN ASSESSMENT IN ADVANCED DEMENTIA (PAINAD)
CONSOLABILITY: 1
TOTALSCORE: 6
BREATHING: 1
CONSOLABILITY: 0
BODYLANGUAGE: 0
NEGVOCALIZATION: 1
FACIALEXPRESSION: 0
NEGVOCALIZATION: 1
FACIALEXPRESSION: 2
TOTALSCORE: 2
BODYLANGUAGE: 1
BREATHING: 1

## 2019-06-28 ASSESSMENT — PAIN SCALES - GENERAL
PAINLEVEL_OUTOF10: 6
PAINLEVEL_OUTOF10: 6

## 2019-06-28 NOTE — PROGRESS NOTES
Scheduled ativan administered. Pt checked for incontinence and repositioned. Transport set for 1600.

## 2019-06-28 NOTE — PROGRESS NOTES
Prn morphine given for comfort during transport. Belongings collected, IV flushed and capped. Transport here to take pt to [facility]. Family aware.

## 2019-06-28 NOTE — CARE COORDINATION
Aware per [address] Rn that transport has been scheduled for 4pm today to [address] [place] [address]. POA to meet w/[facility] at noon to sign consents.   Electronically signed by MERE Garcia on 6/28/2019 at 10:31 AM
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
## 2019-07-01 NOTE — PROGRESS NOTES
Physical Therapy  Physical Therapy Discharge Summary    Name: Taz Parish YOB: 1923    The pt was evaluated by PT on 19 and seen for 1 treatment session prior to DC to [facility] with inpatient hospice on 19 per MD order. The pt's acute therapy goals were:  Short term goals  Time Frame for Short term goals: upon d/c  Short term goal 1: Pt will perform bed mobility with mod A x1. Short term goal 2: Pt will perform sit<> stand with max A x1. Short term goal 3: Pt will ambulate 5' with LRAD and max A x1. Long term goals  Time Frame for Long term goals : STG = LTG    Patient met 0 goals during stay. Number of Refusals:0  Number of Holds: 1  During this hospitalization, the patient was educated on:  Patient Education: reason for PT eval    DC pt from PT caseload at this time. Thank you!     [address] [place], [place] [id number]